# Patient Record
Sex: MALE | URBAN - METROPOLITAN AREA
[De-identification: names, ages, dates, MRNs, and addresses within clinical notes are randomized per-mention and may not be internally consistent; named-entity substitution may affect disease eponyms.]

---

## 2017-06-24 ENCOUNTER — INPATIENT (INPATIENT)
Facility: HOSPITAL | Age: 57
LOS: 3 days | Discharge: HOME | DRG: 100 | End: 2017-06-28
Attending: PSYCHIATRY & NEUROLOGY | Admitting: PSYCHIATRY & NEUROLOGY
Payer: MEDICAID

## 2017-06-24 VITALS
DIASTOLIC BLOOD PRESSURE: 75 MMHG | RESPIRATION RATE: 16 BRPM | HEART RATE: 61 BPM | OXYGEN SATURATION: 97 % | TEMPERATURE: 98 F | SYSTOLIC BLOOD PRESSURE: 112 MMHG

## 2017-06-24 DIAGNOSIS — Z21 ASYMPTOMATIC HUMAN IMMUNODEFICIENCY VIRUS [HIV] INFECTION STATUS: ICD-10-CM

## 2017-06-24 DIAGNOSIS — R63.8 OTHER SYMPTOMS AND SIGNS CONCERNING FOOD AND FLUID INTAKE: ICD-10-CM

## 2017-06-24 DIAGNOSIS — R56.9 UNSPECIFIED CONVULSIONS: ICD-10-CM

## 2017-06-24 DIAGNOSIS — Z29.9 ENCOUNTER FOR PROPHYLACTIC MEASURES, UNSPECIFIED: ICD-10-CM

## 2017-06-24 LAB
ALBUMIN SERPL ELPH-MCNC: 3.9 G/DL — SIGNIFICANT CHANGE UP (ref 3.3–5)
ALP SERPL-CCNC: 78 U/L — SIGNIFICANT CHANGE UP (ref 40–120)
ALT FLD-CCNC: 17 U/L — SIGNIFICANT CHANGE UP (ref 10–45)
ANION GAP SERPL CALC-SCNC: 8 MMOL/L — SIGNIFICANT CHANGE UP (ref 5–17)
ANISOCYTOSIS BLD QL: SLIGHT — SIGNIFICANT CHANGE UP
APPEARANCE UR: CLEAR — SIGNIFICANT CHANGE UP
AST SERPL-CCNC: 19 U/L — SIGNIFICANT CHANGE UP (ref 10–40)
BILIRUB SERPL-MCNC: 0.4 MG/DL — SIGNIFICANT CHANGE UP (ref 0.2–1.2)
BILIRUB UR-MCNC: NEGATIVE — SIGNIFICANT CHANGE UP
BUN SERPL-MCNC: 14 MG/DL — SIGNIFICANT CHANGE UP (ref 7–23)
CALCIUM SERPL-MCNC: 9.2 MG/DL — SIGNIFICANT CHANGE UP (ref 8.4–10.5)
CHLORIDE SERPL-SCNC: 100 MMOL/L — SIGNIFICANT CHANGE UP (ref 96–108)
CK SERPL-CCNC: 119 U/L — SIGNIFICANT CHANGE UP (ref 30–200)
CO2 SERPL-SCNC: 29 MMOL/L — SIGNIFICANT CHANGE UP (ref 22–31)
COLOR SPEC: YELLOW — SIGNIFICANT CHANGE UP
CREAT SERPL-MCNC: 0.7 MG/DL — SIGNIFICANT CHANGE UP (ref 0.5–1.3)
DIFF PNL FLD: NEGATIVE — SIGNIFICANT CHANGE UP
EOSINOPHIL NFR BLD AUTO: 1 % — SIGNIFICANT CHANGE UP (ref 0–6)
EOSINOPHIL NFR BLD AUTO: 2 % — SIGNIFICANT CHANGE UP (ref 0–6)
ETHANOL SERPL-MCNC: <10 MG/DL — SIGNIFICANT CHANGE UP (ref 0–10)
GLUCOSE SERPL-MCNC: 95 MG/DL — SIGNIFICANT CHANGE UP (ref 70–99)
GLUCOSE UR QL: NEGATIVE — SIGNIFICANT CHANGE UP
HCT VFR BLD CALC: 36.8 % — LOW (ref 39–50)
HGB BLD-MCNC: 13 G/DL — SIGNIFICANT CHANGE UP (ref 13–17)
KETONES UR-MCNC: NEGATIVE — SIGNIFICANT CHANGE UP
LACTATE SERPL-SCNC: 0.8 MMOL/L — SIGNIFICANT CHANGE UP (ref 0.5–2)
LEUKOCYTE ESTERASE UR-ACNC: NEGATIVE — SIGNIFICANT CHANGE UP
LYMPHOCYTES # BLD AUTO: 21 % — SIGNIFICANT CHANGE UP (ref 13–44)
LYMPHOCYTES # BLD AUTO: 25 % — SIGNIFICANT CHANGE UP (ref 13–44)
MACROCYTES BLD QL: SLIGHT — SIGNIFICANT CHANGE UP
MAGNESIUM SERPL-MCNC: 2 MG/DL — SIGNIFICANT CHANGE UP (ref 1.6–2.6)
MANUAL SMEAR VERIFICATION: SIGNIFICANT CHANGE UP
MCHC RBC-ENTMCNC: 27.1 PG — SIGNIFICANT CHANGE UP (ref 27–34)
MCHC RBC-ENTMCNC: 35.3 G/DL — SIGNIFICANT CHANGE UP (ref 32–36)
MCV RBC AUTO: 76.7 FL — LOW (ref 80–100)
MONOCYTES NFR BLD AUTO: 18 % — HIGH (ref 2–14)
MONOCYTES NFR BLD AUTO: 7 % — SIGNIFICANT CHANGE UP (ref 2–14)
NEUTROPHILS NFR BLD AUTO: 56 % — SIGNIFICANT CHANGE UP (ref 43–77)
NEUTROPHILS NFR BLD AUTO: 69 % — SIGNIFICANT CHANGE UP (ref 43–77)
NITRITE UR-MCNC: NEGATIVE — SIGNIFICANT CHANGE UP
OVALOCYTES BLD QL SMEAR: SLIGHT — SIGNIFICANT CHANGE UP
PCP SPEC-MCNC: SIGNIFICANT CHANGE UP
PH UR: 7 — SIGNIFICANT CHANGE UP (ref 5–8)
PHOSPHATE SERPL-MCNC: 2.1 MG/DL — LOW (ref 2.5–4.5)
PLAT MORPH BLD: NORMAL — SIGNIFICANT CHANGE UP
PLATELET # BLD AUTO: 161 K/UL — SIGNIFICANT CHANGE UP (ref 150–400)
POTASSIUM SERPL-MCNC: 4 MMOL/L — SIGNIFICANT CHANGE UP (ref 3.5–5.3)
POTASSIUM SERPL-SCNC: 4 MMOL/L — SIGNIFICANT CHANGE UP (ref 3.5–5.3)
PROT SERPL-MCNC: 7.4 G/DL — SIGNIFICANT CHANGE UP (ref 6–8.3)
PROT UR-MCNC: NEGATIVE MG/DL — SIGNIFICANT CHANGE UP
RBC # BLD: 4.8 M/UL — SIGNIFICANT CHANGE UP (ref 4.2–5.8)
RBC # FLD: 14.4 % — SIGNIFICANT CHANGE UP (ref 10.3–16.9)
RBC BLD AUTO: (no result)
SODIUM SERPL-SCNC: 137 MMOL/L — SIGNIFICANT CHANGE UP (ref 135–145)
SP GR SPEC: 1.01 — SIGNIFICANT CHANGE UP (ref 1–1.03)
SPHEROCYTES BLD QL SMEAR: SLIGHT — SIGNIFICANT CHANGE UP
TSH SERPL-MCNC: 0.07 UIU/ML — LOW (ref 0.35–4.94)
UROBILINOGEN FLD QL: 0.2 E.U./DL — SIGNIFICANT CHANGE UP
WBC # BLD: 3.2 K/UL — LOW (ref 3.8–10.5)
WBC # FLD AUTO: 3.2 K/UL — LOW (ref 3.8–10.5)

## 2017-06-24 PROCEDURE — 99285 EMERGENCY DEPT VISIT HI MDM: CPT | Mod: 25

## 2017-06-24 PROCEDURE — 99223 1ST HOSP IP/OBS HIGH 75: CPT

## 2017-06-24 PROCEDURE — 93010 ELECTROCARDIOGRAM REPORT: CPT

## 2017-06-24 PROCEDURE — 70450 CT HEAD/BRAIN W/O DYE: CPT | Mod: 26

## 2017-06-24 PROCEDURE — 71020: CPT | Mod: 26

## 2017-06-24 RX ORDER — HEPARIN SODIUM 5000 [USP'U]/ML
5000 INJECTION INTRAVENOUS; SUBCUTANEOUS EVERY 8 HOURS
Qty: 0 | Refills: 0 | Status: DISCONTINUED | OUTPATIENT
Start: 2017-06-24 | End: 2017-06-26

## 2017-06-24 RX ORDER — ASPIRIN/CALCIUM CARB/MAGNESIUM 324 MG
1 TABLET ORAL
Qty: 0 | Refills: 0 | COMMUNITY

## 2017-06-24 RX ORDER — METHADONE HYDROCHLORIDE 40 MG/1
5 TABLET ORAL
Qty: 0 | Refills: 0 | COMMUNITY

## 2017-06-24 RX ORDER — ELVITEGRAVIR, COBICISTAT, EMTRICITABINE, AND TENOFOVIR ALAFENAMIDE 150; 150; 200; 10 MG/1; MG/1; MG/1; MG/1
1 TABLET ORAL
Qty: 0 | Refills: 0 | COMMUNITY

## 2017-06-24 RX ORDER — ASCORBIC ACID 60 MG
1 TABLET,CHEWABLE ORAL
Qty: 0 | Refills: 0 | COMMUNITY

## 2017-06-24 RX ADMIN — HEPARIN SODIUM 5000 UNIT(S): 5000 INJECTION INTRAVENOUS; SUBCUTANEOUS at 21:33
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
## 2017-06-24 NOTE — H&P ADULT - NSHPSOCIALHISTORY_GEN_ALL_CORE
Recent smoker, 3 cigarettes daily for the past few months; does not drink alcohol; ex-heroin user, currently does not use illicit drugs (intermittently takes methadone)

## 2017-06-24 NOTE — ED PROVIDER NOTE - ATTENDING CONTRIBUTION TO CARE
Pt is a 58yo m, h/o hiv (? cd4/vl), ex-ivda on methadone, who presents s/p sz witnessed by brother. Pt does not recall event. + tongue biting. No urinary/ fecal incontinence. Currently c/o mild fatigue. No cp, sob, palp, ha, visual changes, focal n/t/w... Afebrile. HDS. WNWD m in nad. + s1, s2, rrr. Lungs cta b/l. Abd soft, nt/nd. Neuro exam non-focal. + superficial abrasion to tip of tongue. Labs reviewed with findings of mildy low wbc at 3; electrolytes wnl; lactate wnl. CT head neg for acute injury. Case d/w epilepsy; recommending admission for further w/u including video eeg.
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
## 2017-06-24 NOTE — H&P ADULT - PROBLEM SELECTOR PLAN 4
Regular diet as tolerated.  Replete lytes prn  PO fluids    Dispo:  Admit to [facility] for vEEG monitoring.

## 2017-06-24 NOTE — ED ADULT NURSE NOTE - OBJECTIVE STATEMENT
Patient, hx HIV, polio ( left hand weakness ), substance abuse  A&OX3 denies headache, Patiens brother witnessed him having a seizure ( shaking ) in the bathroom ( 5 minutes)  . Patient was confused after event . Denies injury. Pt is on Methadone 15 mg have methadone for 5 days.
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
## 2017-06-24 NOTE — ED PROVIDER NOTE - OBJECTIVE STATEMENT
58 y/o m with hx HIV+ on HAART (reports VL undetectable), former IVDU on methadone presents stating brother witnessed him having a seizure this morning.  Pt stating he doesn't remember what happened, he was watching TV and he woke up and his brother had called 911.  Brother stating he thinks it lasted "about 8 minutes".  Pt stating he feels well now, slightly tired.  Pt does report bleeding from his mouth, saw a cut on his tongue.  Denies fever, headache, blurred vision, weakness, trauma, all other ROS negative.

## 2017-06-24 NOTE — H&P ADULT - PROBLEM SELECTOR PLAN 2
Leukocytosis 2/2 HIV, no signs of sepsis at this time. Per patient, does not remember medication however states takes 2 pill once a day, possibly Genvoya.  - need collateral regarding medication and previous VL/CD4s (Dr. Goodwin?)  - f/u CD4 count, VL  - no need for PPX at this time Leukopenia 2/2 HIV, no signs of sepsis at this time. Per patient, does not remember medication however states takes 1 pill once a day, possibly Genvoya.  - need collateral regarding medication and previous VL/CD4s (Dr. Goodwin?)  - f/u CD4 count, VL  - no need for PPX at this time

## 2017-06-24 NOTE — ED ADULT TRIAGE NOTE - CHIEF COMPLAINT QUOTE
new onset seizure today. As per brother "he was shaking and it last about 3 minutes." Patient noted to have laceration to tip of tongue. Patient denies hx of seizures. A&Ox3 on arrival, ambulating with steady gait.

## 2017-06-24 NOTE — ED PROVIDER NOTE - MEDICAL DECISION MAKING DETAILS
58 y/o m s/p witnessed seizure today; labs wnl including lactate, head CT shows no masses.  Case discussed with epilepsy Dr. Ross and will admit to EMU for further w/u, EEG monitoring.

## 2017-06-25 LAB
ALBUMIN SERPL ELPH-MCNC: 3.7 G/DL — SIGNIFICANT CHANGE UP (ref 3.3–5)
ALP SERPL-CCNC: 74 U/L — SIGNIFICANT CHANGE UP (ref 40–120)
ALT FLD-CCNC: 12 U/L — SIGNIFICANT CHANGE UP (ref 10–45)
ANION GAP SERPL CALC-SCNC: 9 MMOL/L — SIGNIFICANT CHANGE UP (ref 5–17)
APTT BLD: 27.8 SEC — SIGNIFICANT CHANGE UP (ref 27.5–37.4)
AST SERPL-CCNC: 17 U/L — SIGNIFICANT CHANGE UP (ref 10–40)
BASOPHILS NFR BLD AUTO: 0.3 % — SIGNIFICANT CHANGE UP (ref 0–2)
BILIRUB SERPL-MCNC: 0.4 MG/DL — SIGNIFICANT CHANGE UP (ref 0.2–1.2)
BUN SERPL-MCNC: 13 MG/DL — SIGNIFICANT CHANGE UP (ref 7–23)
CALCIUM SERPL-MCNC: 9.1 MG/DL — SIGNIFICANT CHANGE UP (ref 8.4–10.5)
CHLORIDE SERPL-SCNC: 103 MMOL/L — SIGNIFICANT CHANGE UP (ref 96–108)
CO2 SERPL-SCNC: 28 MMOL/L — SIGNIFICANT CHANGE UP (ref 22–31)
CREAT SERPL-MCNC: 0.6 MG/DL — SIGNIFICANT CHANGE UP (ref 0.5–1.3)
EOSINOPHIL NFR BLD AUTO: 1.5 % — SIGNIFICANT CHANGE UP (ref 0–6)
FOLATE SERPL-MCNC: >20 NG/ML — SIGNIFICANT CHANGE UP (ref 4.8–24.2)
GLUCOSE SERPL-MCNC: 121 MG/DL — HIGH (ref 70–99)
HCT VFR BLD CALC: 36.8 % — LOW (ref 39–50)
HGB BLD-MCNC: 13 G/DL — SIGNIFICANT CHANGE UP (ref 13–17)
INR BLD: 1.11 — SIGNIFICANT CHANGE UP (ref 0.88–1.16)
LYMPHOCYTES # BLD AUTO: 36.7 % — SIGNIFICANT CHANGE UP (ref 13–44)
MAGNESIUM SERPL-MCNC: 2.1 MG/DL — SIGNIFICANT CHANGE UP (ref 1.6–2.6)
MCHC RBC-ENTMCNC: 26.5 PG — LOW (ref 27–34)
MCHC RBC-ENTMCNC: 35.3 G/DL — SIGNIFICANT CHANGE UP (ref 32–36)
MCV RBC AUTO: 74.9 FL — LOW (ref 80–100)
MONOCYTES NFR BLD AUTO: 9 % — SIGNIFICANT CHANGE UP (ref 2–14)
NEUTROPHILS NFR BLD AUTO: 52.5 % — SIGNIFICANT CHANGE UP (ref 43–77)
PHOSPHATE SERPL-MCNC: 2.8 MG/DL — SIGNIFICANT CHANGE UP (ref 2.5–4.5)
PLATELET # BLD AUTO: 177 K/UL — SIGNIFICANT CHANGE UP (ref 150–400)
POTASSIUM SERPL-MCNC: 4 MMOL/L — SIGNIFICANT CHANGE UP (ref 3.5–5.3)
POTASSIUM SERPL-SCNC: 4 MMOL/L — SIGNIFICANT CHANGE UP (ref 3.5–5.3)
PROT SERPL-MCNC: 7.2 G/DL — SIGNIFICANT CHANGE UP (ref 6–8.3)
PROTHROM AB SERPL-ACNC: 12.4 SEC — SIGNIFICANT CHANGE UP (ref 9.8–12.7)
RBC # BLD: 4.91 M/UL — SIGNIFICANT CHANGE UP (ref 4.2–5.8)
RBC # FLD: 13.9 % — SIGNIFICANT CHANGE UP (ref 10.3–16.9)
SODIUM SERPL-SCNC: 140 MMOL/L — SIGNIFICANT CHANGE UP (ref 135–145)
VIT B12 SERPL-MCNC: 371 PG/ML — SIGNIFICANT CHANGE UP (ref 243–894)
WBC # BLD: 3.4 K/UL — LOW (ref 3.8–10.5)
WBC # FLD AUTO: 3.4 K/UL — LOW (ref 3.8–10.5)

## 2017-06-25 PROCEDURE — 95951: CPT | Mod: 26

## 2017-06-25 PROCEDURE — 99232 SBSQ HOSP IP/OBS MODERATE 35: CPT

## 2017-06-25 RX ORDER — ACETAMINOPHEN 500 MG
650 TABLET ORAL EVERY 6 HOURS
Qty: 0 | Refills: 0 | Status: DISCONTINUED | OUTPATIENT
Start: 2017-06-25 | End: 2017-06-28

## 2017-06-25 RX ORDER — ELVITEGRAVIR, COBICISTAT, EMTRICITABINE, AND TENOFOVIR ALAFENAMIDE 150; 150; 200; 10 MG/1; MG/1; MG/1; MG/1
1 TABLET ORAL DAILY
Qty: 0 | Refills: 0 | Status: DISCONTINUED | OUTPATIENT
Start: 2017-06-25 | End: 2017-06-28

## 2017-06-25 RX ORDER — LEVETIRACETAM 250 MG/1
500 TABLET, FILM COATED ORAL
Qty: 0 | Refills: 0 | Status: DISCONTINUED | OUTPATIENT
Start: 2017-06-25 | End: 2017-06-28

## 2017-06-25 RX ORDER — LEVETIRACETAM 250 MG/1
750 TABLET, FILM COATED ORAL ONCE
Qty: 0 | Refills: 0 | Status: COMPLETED | OUTPATIENT
Start: 2017-06-25 | End: 2017-06-25

## 2017-06-25 RX ADMIN — HEPARIN SODIUM 5000 UNIT(S): 5000 INJECTION INTRAVENOUS; SUBCUTANEOUS at 15:40

## 2017-06-25 RX ADMIN — ELVITEGRAVIR, COBICISTAT, EMTRICITABINE, AND TENOFOVIR ALAFENAMIDE 1 TABLET(S): 150; 150; 200; 10 TABLET ORAL at 17:00

## 2017-06-25 RX ADMIN — HEPARIN SODIUM 5000 UNIT(S): 5000 INJECTION INTRAVENOUS; SUBCUTANEOUS at 05:57

## 2017-06-25 RX ADMIN — LEVETIRACETAM 400 MILLIGRAM(S): 250 TABLET, FILM COATED ORAL at 11:52

## 2017-06-25 RX ADMIN — HEPARIN SODIUM 5000 UNIT(S): 5000 INJECTION INTRAVENOUS; SUBCUTANEOUS at 21:35

## 2017-06-25 RX ADMIN — LEVETIRACETAM 500 MILLIGRAM(S): 250 TABLET, FILM COATED ORAL at 21:13

## 2017-06-25 RX ADMIN — Medication 650 MILLIGRAM(S): at 22:10

## 2017-06-25 RX ADMIN — Medication 650 MILLIGRAM(S): at 21:13
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
## 2017-06-25 NOTE — PROGRESS NOTE ADULT - SUBJECTIVE AND OBJECTIVE BOX
CHIEF COMPLAINT:    Seizures.    INTERVAL HISTORY:    Patient reports no further seizures or confusion, however there were 2 left temporal seizures last night.  THey were relatively bland (the first with his brother talking on the phone besides his bed), but one with clear lip smacking and oromotor automatisms awakening him from sleep.      Discussed options to treat seizures, including trial of levetiracetam.    REVIEW OF SYSTEMS:  As per HPI, otherwise negative for Constitutional, Eyes, Ears/Nose/Mouth/Throat, Neck, Cardiovascular, Respiratory, Gastrointestinal, Genitourinary, Skin, Endocrine, Musculoskeletal, Psychiatric, and Hematologic/Lymphatic.    VITAL SIGNS:  Vital Signs Last 24 Hrs  T(C): 36.9, Max: 36.9 (06-25 @ 11:45)  T(F): 98.4, Max: 98.4 (06-25 @ 11:45)  HR: 57 (51 - 58)  BP: 121/76 (112/76 - 121/76)  BP(mean): --  RR: 15 (15 - 16)  SpO2: 96% (96% - 98%)    PHYSICAL EXAMINATION:  General: Well-developed, well nourished, in no acute distress.  Eyes: Conjunctiva and sclera clear.  Neurologic:  - Mental Status:  Alert, awake, oriented to person, place, and time; Speech is fluent:    Pupils are equal, round, and reactive to light.  III, IV, VI:  Extraocular movements are intact without nystagmus.  VII:  Face is symmetric with normal eye closure and smile  VIII:  Hearing is intact to finger rub.  IX, X:  Uvula is midline and soft palate rises symmetrically  XI:  Head turning and shoulder shrug are intact.  XII:  Tongue protrudes in the midline - small laceration in the left tip.  - Motor:    Bulk Left arm atrophy and paresis.  - Gait:   Normal steps

## 2017-06-25 NOTE — PROGRESS NOTE ADULT - PROBLEM SELECTOR PLAN 2
Leukopenia 2/2 HIV, no signs of sepsis at this time. Per patient, does not remember medication however states takes 1 pill once a day, possibly Genvoya.  - need collateral regarding medication and previous VL/CD4s (Dr. Goodwin?)  - f/u CD4 count, VL  - no need for PPX at this time
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
## 2017-06-26 ENCOUNTER — TRANSCRIPTION ENCOUNTER (OUTPATIENT)
Age: 57
End: 2017-06-26

## 2017-06-26 DIAGNOSIS — R94.6 ABNORMAL RESULTS OF THYROID FUNCTION STUDIES: ICD-10-CM

## 2017-06-26 DIAGNOSIS — B20 HUMAN IMMUNODEFICIENCY VIRUS [HIV] DISEASE: ICD-10-CM

## 2017-06-26 LAB
4/8 RATIO: 0.1 RATIO — LOW (ref 0.9–3.6)
ABS CD8: 748 /UL — SIGNIFICANT CHANGE UP (ref 136–757)
CD3 BLASTS SPEC-ACNC: 79 % — SIGNIFICANT CHANGE UP (ref 59–85)
CD3 BLASTS SPEC-ACNC: 840 /UL — SIGNIFICANT CHANGE UP (ref 799–2171)
CD4 %: 7 % — LOW (ref 36–65)
CD8 %: 70 % — HIGH (ref 11–36)
HIV-1 VIRAL LOAD RESULT: (no result)
HIV1 RNA # SERPL NAA+PROBE: SIGNIFICANT CHANGE UP
HIV1 RNA SER-IMP: SIGNIFICANT CHANGE UP
HIV1 RNA SERPL NAA+PROBE-ACNC: (no result)
HIV1 RNA SERPL NAA+PROBE-LOG#: 3.79 — SIGNIFICANT CHANGE UP
T-CELL CD4 SUBSET PNL BLD: 76 /UL — LOW (ref 489–1457)
T4 AB SER-ACNC: 7.75 UG/DL — SIGNIFICANT CHANGE UP (ref 3.17–11.72)
TSH SERPL-MCNC: 0.51 UIU/ML — SIGNIFICANT CHANGE UP (ref 0.35–4.94)

## 2017-06-26 PROCEDURE — 99233 SBSQ HOSP IP/OBS HIGH 50: CPT

## 2017-06-26 PROCEDURE — 70491 CT SOFT TISSUE NECK W/DYE: CPT | Mod: 26

## 2017-06-26 PROCEDURE — 70460 CT HEAD/BRAIN W/DYE: CPT | Mod: 26

## 2017-06-26 RX ADMIN — Medication 650 MILLIGRAM(S): at 07:01

## 2017-06-26 RX ADMIN — HEPARIN SODIUM 5000 UNIT(S): 5000 INJECTION INTRAVENOUS; SUBCUTANEOUS at 14:52

## 2017-06-26 RX ADMIN — Medication 650 MILLIGRAM(S): at 06:16

## 2017-06-26 RX ADMIN — Medication 1 TABLET(S): at 12:09

## 2017-06-26 RX ADMIN — LEVETIRACETAM 500 MILLIGRAM(S): 250 TABLET, FILM COATED ORAL at 06:13

## 2017-06-26 RX ADMIN — ELVITEGRAVIR, COBICISTAT, EMTRICITABINE, AND TENOFOVIR ALAFENAMIDE 1 TABLET(S): 150; 150; 200; 10 TABLET ORAL at 12:09

## 2017-06-26 RX ADMIN — HEPARIN SODIUM 5000 UNIT(S): 5000 INJECTION INTRAVENOUS; SUBCUTANEOUS at 06:13

## 2017-06-26 RX ADMIN — LEVETIRACETAM 500 MILLIGRAM(S): 250 TABLET, FILM COATED ORAL at 18:50

## 2017-06-26 NOTE — DISCHARGE NOTE ADULT - PROVIDER TOKENS
KIKI:'[id number]' TOKIVAN:'[id number]',FREE:[LAST:[LeMonda],FIRST:[Shiela],PHONE:[phone]],FAX:[(   )    -],ADDRESS:[[facility] [address]    APPOINTMENT SCHEDULED: Friday 7/7/17 at 9:00am]],FREE:[LAST:[Osbaldo],FIRST:[David CHERY), Neurology],PHONE:[phone]],FAX:[(   )    -],ADDRESS:[[facility] [address]     APPOINTMENT SCHEDULED: Wednesday 7/5/17 at 9:30am]] KIKI:'[id number]'
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
## 2017-06-26 NOTE — DISCHARGE NOTE ADULT - MEDICATION SUMMARY - MEDICATIONS TO TAKE
I will START or STAY ON the medications listed below when I get home from the hospital:    methadone 10 mg/5 mL oral solution  -- 5 milliliter(s) by mouth every 8 hours  -- Indication: For Substance abuse    aspirin 81 mg oral delayed release tablet  -- 1 tab(s) by mouth once a day  -- Indication: For Need for prophylactic measure    Genvoya oral tablet  -- 1 tab(s) by mouth once a day  -- Indication: For HIV (human immunodeficiency virus infection)    Vitamin C  mg oral capsule  -- 1 cap(s) by mouth once a day  -- Indication: For Nutrition, metabolism, and development symptoms I will START or STAY ON the medications listed below when I get home from the hospital:    methadone 10 mg/5 mL oral solution  -- 5 milliliter(s) by mouth every 8 hours  -- Indication: For Substance abuse    aspirin 81 mg oral delayed release tablet  -- 1 tab(s) by mouth once a day  -- Indication: For Need for prophylactic measure    levETIRAcetam 500 mg oral tablet  -- 1 tab(s) by mouth 2 times a day  -- Indication: For Seizure    Genvoya oral tablet  -- 1 tab(s) by mouth once a day  -- Indication: For HIV (human immunodeficiency virus infection)    sulfamethoxazole-trimethoprim 800 mg-160 mg oral tablet  -- 1 tab(s) by mouth once a day  -- Indication: For HIV (human immunodeficiency virus infection)    Vitamin C  mg oral capsule  -- 1 cap(s) by mouth once a day  -- Indication: For Nutrition, metabolism, and development symptoms
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
## 2017-06-26 NOTE — PROGRESS NOTE ADULT - PROBLEM SELECTOR PLAN 2
Leukopenia 2/2 HIV, no signs of sepsis at this time. Per patient, does not remember medication however states takes 1 pill once a day, possibly Genvoya.  - need collateral regarding medication and previous VL/CD4s (Dr. Goodwin?)  - f/u CD4 count, VL  - no need for PPX at this time Leukopenia 2/2 HIV, no signs of sepsis at this time. Per patient, does not remember medication however states takes 1 pill once a day, possibly Genvoya.  - need collateral regarding medication and previous VL/CD4s (Dr. Goodwin?)  - CD4 count 76, appreciate HIV/ID recommendations- pending CTH and neck w/wo contrast  - Dr. Roblero of Neuro-infectious disease also consulted- ? need for LP  - Neuropsych consulted due to poor performance on MoCA. ? AIDs-related dementia versus more acute process resulting in cognitive issues.

## 2017-06-26 NOTE — DISCHARGE NOTE ADULT - CARE PROVIDER_API CALL
Malick Ross (MD), Clinical Neurophysiology; Neurology; Sleep Medicine  [address] [place] [address]  Phone: [phone]  Fax: [phone] Malick Ross), Clinical Neurophysiology; Neurology; Sleep Medicine  [address] [address]  Phone: [phone]  Fax: [phone]    Shiela Dominique  [address]    APPOINTMENT SCHEDULED: Friday 7/7/17 at 9:00am  Phone: [phone]  Fax: (   )    -    David Roblero), Neurology  [address]     APPOINTMENT SCHEDULED: Wednesday 7/5/17 at 9:30am  Phone: [phone]  Fax: (   )    - Malick Ross (MD), Clinical Neurophysiology; Neurology; Sleep Medicine  [address] [place] [address]  Phone: [phone]  Fax: [phone]
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
## 2017-06-26 NOTE — DISCHARGE NOTE ADULT - PLAN OF CARE
Follow up with your PMD Follow up with your primary medical doctor Your CD4 count is 76 with a detectable viral load. This puts you at higher risk for serious infections. Please follow up with your primary care doctor and take your medications as prescribed. Follow up with Dr. Roblero for further evaluation. Continue to take your seizure medication (Keppra) as prescribed. Follow up with Dr. 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 Follow up with Dr. Roblero for further evaluation. Continue to take your seizure medication (Keppra) as prescribed. It is important for you to stop using marijuana, heroin and any other drugs. If you need assistance with stopping, please seek out a drug assistance program through [facility]. Your CD4 count is 76 with an HIV viral load of 6192. This puts you at higher risk for serious infections. Please take your Genvoya every day.  It is important that you DO NOT miss doses as this increases the likelihood of developing medication resistance and failing to control the virus. Please take Bactrim once a day to prevent pneumonia. You were admitted to the hospital after having 2 seizures at home. You were evaluated on an EEG and found to have 2 additional seizures in the hospital. You were started on an anti-seizure medication called Keppra which is preventing further seizures. Please continue to take Keppra 500mg twice a day.  DO NOT DRIVE/OPERATE HEAVY MACHINERY due to seizure risk. Follow up with Dr. Roblero for further evaluation.
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
## 2017-06-26 NOTE — CONSULT NOTE ADULT - PROBLEM SELECTOR RECOMMENDATION 9
-VL 6192, CD4 76. On Genvoya until 1 month ago.  -Patient reports long history of undetectable viral load and CD4 count > 200 until very recently, which makes the likelihood of oppurtunistic infection as a cause of his seizures less likely. Drug use or withdrawal may be an etiology given his history.  -Please obtain collateral from his outpatient provider re: historical viral load and CD4 count, history of OIs, medication history.  -Please check HSV-1/2 serologies, CMV PCR in serum, Cryptococcal Antigen in serum, Syphillis screen, GC/Chlamydia urine.  -CT head without contrast negative. Please check CT Brain with contrast.  -Please resume Genvoya qDaily.  -Please start PCP prophylaxis with Bactrim DS 1 tab daily while CD4 < 200.  -Will follow.    -Plan was discussed with the patient.  -Case discussed with Dr Rowland and Primary team.
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
## 2017-06-26 NOTE — DISCHARGE NOTE ADULT - CARE PLAN
Principal Discharge DX:	Seizure  Secondary Diagnosis:	HIV (human immunodeficiency virus infection)  Goal:	Follow up with your primary medical doctor  Secondary Diagnosis:	Substance abuse  Secondary Diagnosis:	TSH deficiency  Goal:	Follow up with your PMD Principal Discharge DX:	Seizure  Goal:	Follow up with Dr. Roblero  Instructions for follow-up, activity and diet:	Follow up with Dr. Roblero for further evaluation. Continue to take your seizure medication (Keppra) as prescribed.  Secondary Diagnosis:	HIV (human immunodeficiency virus infection)  Goal:	Follow up with your primary medical doctor  Instructions for follow-up, activity and diet:	Your CD4 count is 76 with a detectable viral load. This puts you at higher risk for serious infections. Please follow up with your primary care doctor and take your medications as prescribed.  Secondary Diagnosis:	Substance abuse  Secondary Diagnosis:	TSH deficiency  Goal:	Follow up with your PMD Principal Discharge DX:	Seizure  Goal:	Follow up with Dr. Roblero  Instructions for follow-up, activity and diet:	DO NOT DRIVE/OPERATE HEAVY MACHINARY due to seizure risk. Follow up with Dr. Roblero for further evaluation. Continue to take your seizure medication (Keppra) as prescribed.  Secondary Diagnosis:	HIV (human immunodeficiency virus infection)  Goal:	Follow up with your primary medical doctor  Instructions for follow-up, activity and diet:	Your CD4 count is 76 with a detectable viral load. This puts you at higher risk for serious infections. Please follow up with your primary care doctor and take your medications as prescribed.  Secondary Diagnosis:	Substance abuse  Secondary Diagnosis:	TSH deficiency  Goal:	Follow up with your PMD Principal Discharge DX:	Seizure  Goal:	Follow up with Dr. Roblero  Instructions for follow-up, activity and diet:	You were admitted to the hospital after having 2 seizures at home. You were evaluated on an EEG and found to have 2 additional seizures in the hospital. You were started on an anti-seizure medication called Keppra which is preventing further seizures. Please continue to take Keppra 500mg twice a day.  DO NOT DRIVE/OPERATE HEAVY MACHINERY due to seizure risk. Follow up with Dr. Roblero for further evaluation.  Secondary Diagnosis:	HIV (human immunodeficiency virus infection)  Goal:	Follow up with your primary medical doctor  Instructions for follow-up, activity and diet:	Your CD4 count is 76 with an HIV viral load of 6192. This puts you at higher risk for serious infections. Please take your Genvoya every day.  It is important that you DO NOT miss doses as this increases the likelihood of developing medication resistance and failing to control the virus. Please take Bactrim once a day to prevent pneumonia.  Secondary Diagnosis:	Substance abuse  Instructions for follow-up, activity and diet:	It is important for you to stop using marijuana, heroin and any other drugs. If you need assistance with stopping, please seek out a drug assistance program through [facility]. Principal Discharge DX:	Seizure  Goal:	Follow up with Dr. Roblero  Instructions for follow-up, activity and diet:	You were admitted to the hospital after having 2 seizures at home. You were evaluated on an EEG and found to have 2 additional seizures in the hospital. You were started on an anti-seizure medication called Keppra which is preventing further seizures. Please continue to take Keppra 500mg twice a day.  DO NOT DRIVE/OPERATE HEAVY MACHINERY due to seizure risk. Follow up with Dr. Roblero for further evaluation.  Secondary Diagnosis:	HIV (human immunodeficiency virus infection)  Goal:	Follow up with your primary medical doctor  Instructions for follow-up, activity and diet:	Your CD4 count is 76 with an HIV viral load of 6192. This puts you at higher risk for serious infections. Please take your Genvoya every day.  It is important that you DO NOT miss doses as this increases the likelihood of developing medication resistance and failing to control the virus. Please take Bactrim once a day to prevent pneumonia.  Secondary Diagnosis:	Substance abuse  Instructions for follow-up, activity and diet:	It is important for you to stop using marijuana, heroin and any other drugs. If you need assistance with stopping, please seek out a drug assistance program through [facility]. Principal Discharge DX:	Seizure  Goal:	Follow up with Dr. Roblero  Instructions for follow-up, activity and diet:	You were admitted to the hospital after having 2 seizures at home. You were evaluated on an EEG and found to have 2 additional seizures in the hospital. You were started on an anti-seizure medication called Keppra which is preventing further seizures. Please continue to take Keppra 500mg twice a day.  DO NOT DRIVE/OPERATE HEAVY MACHINERY due to seizure risk. Follow up with Dr. Roblero for further evaluation.  Secondary Diagnosis:	HIV (human immunodeficiency virus infection)  Goal:	Follow up with your primary medical doctor  Instructions for follow-up, activity and diet:	Your CD4 count is 76 with an HIV viral load of 6192. This puts you at higher risk for serious infections. Please take your Genvoya every day.  It is important that you DO NOT miss doses as this increases the likelihood of developing medication resistance and failing to control the virus. Please take Bactrim once a day to prevent pneumonia.  Secondary Diagnosis:	Substance abuse  Instructions for follow-up, activity and diet:	It is important for you to stop using marijuana, heroin and any other drugs. If you need assistance with stopping, please seek out a drug assistance program through [facility]. Principal Discharge DX:	Seizure  Goal:	Follow up with Dr. Roblero  Instructions for follow-up, activity and diet:	You were admitted to the hospital after having 2 seizures at home. You were evaluated on an EEG and found to have 2 additional seizures in the hospital. You were started on an anti-seizure medication called Keppra which is preventing further seizures. Please continue to take Keppra 500mg twice a day.  DO NOT DRIVE/OPERATE HEAVY MACHINERY due to seizure risk. Follow up with Dr. Roblero for further evaluation.  Secondary Diagnosis:	HIV (human immunodeficiency virus infection)  Goal:	Follow up with your primary medical doctor  Instructions for follow-up, activity and diet:	Your CD4 count is 76 with an HIV viral load of 6192. This puts you at higher risk for serious infections. Please take your Genvoya every day.  It is important that you DO NOT miss doses as this increases the likelihood of developing medication resistance and failing to control the virus. Please take Bactrim once a day to prevent pneumonia.  Secondary Diagnosis:	Substance abuse  Instructions for follow-up, activity and diet:	It is important for you to stop using marijuana, heroin and any other drugs. If you need assistance with stopping, please seek out a drug assistance program through [facility].

## 2017-06-26 NOTE — DISCHARGE NOTE ADULT - CARE PROVIDERS DIRECT ADDRESSES
[email or web address],[email or web address] [email or web address]

## 2017-06-27 LAB
APPEARANCE CSF: CLEAR — SIGNIFICANT CHANGE UP
APPEARANCE SPUN FLD: COLORLESS — SIGNIFICANT CHANGE UP
COLOR CSF: SIGNIFICANT CHANGE UP
CRYPTOC AG CSF-ACNC: NEGATIVE — SIGNIFICANT CHANGE UP
CRYPTOC AG FLD QL: NEGATIVE — SIGNIFICANT CHANGE UP
CSF COMMENTS: SIGNIFICANT CHANGE UP
GLUCOSE CSF-MCNC: 59 MG/DL — SIGNIFICANT CHANGE UP (ref 40–70)
GRAM STN FLD: SIGNIFICANT CHANGE UP
HSV1 IGG SER-ACNC: 1.5 INDEX — HIGH
HSV1 IGG SERPL QL IA: POSITIVE
HSV2 IGG FLD-ACNC: 4.18 INDEX — HIGH
HSV2 IGG SERPL QL IA: POSITIVE
LYMPHOCYTES # CSF: 1 % — LOW (ref 40–80)
NEUTROPHILS # CSF: 0 % — SIGNIFICANT CHANGE UP (ref 0–6)
NRBC NFR CSF: 1 /UL — SIGNIFICANT CHANGE UP (ref 0–5)
PROT CSF-MCNC: 45 MG/DL — SIGNIFICANT CHANGE UP (ref 15–45)
RBC # CSF: 11 /UL — HIGH (ref 0–0)
SPECIMEN SOURCE: SIGNIFICANT CHANGE UP
T PALLIDUM AB TITR SER: NEGATIVE — SIGNIFICANT CHANGE UP
T3 SERPL-MCNC: 124 NG/DL — SIGNIFICANT CHANGE UP (ref 80–200)
TUBE TYPE: SIGNIFICANT CHANGE UP

## 2017-06-27 PROCEDURE — 62270 DX LMBR SPI PNXR: CPT | Mod: GC

## 2017-06-27 PROCEDURE — 99233 SBSQ HOSP IP/OBS HIGH 50: CPT

## 2017-06-27 PROCEDURE — 95951: CPT | Mod: 26

## 2017-06-27 PROCEDURE — 96116 NUBHVL XM PHYS/QHP 1ST HR: CPT

## 2017-06-27 RX ADMIN — LEVETIRACETAM 500 MILLIGRAM(S): 250 TABLET, FILM COATED ORAL at 18:48

## 2017-06-27 RX ADMIN — Medication 1 TABLET(S): at 11:31

## 2017-06-27 RX ADMIN — ELVITEGRAVIR, COBICISTAT, EMTRICITABINE, AND TENOFOVIR ALAFENAMIDE 1 TABLET(S): 150; 150; 200; 10 TABLET ORAL at 11:31

## 2017-06-27 RX ADMIN — LEVETIRACETAM 500 MILLIGRAM(S): 250 TABLET, FILM COATED ORAL at 06:21
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
## 2017-06-27 NOTE — PROGRESS NOTE ADULT - PROBLEM SELECTOR PLAN 2
Leukopenia 2/2 HIV, no signs of sepsis at this time. Per patient, does not remember medication however states takes 1 pill once a day, possibly Genvoya.  - need collateral regarding medication and previous VL/CD4s (Dr. Goodwin?)  - CD4 count 76, appreciate HIV/ID recommendations- pending CTH and neck w/wo contrast  - Dr. Roblero of Neuro-infectious disease also consulted- ? need for LP  - Neuropsych consulted due to poor performance on MoCA. ? AIDs-related dementia versus more acute process resulting in cognitive issues. Leukopenia 2/2 HIV, no signs of sepsis at this time. Per patient, does not remember medication however states takes 1 pill once a day, possibly Genvoya.  - need collateral regarding medication and previous VL/CD4s (Dr. Goodwin?)  - CD4 count 76, appreciate HIV/ID recommendations
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
## 2017-06-27 NOTE — PROGRESS NOTE ADULT - PROBLEM SELECTOR PROBLEM 2
HIV (human immunodeficiency virus infection)

## 2017-06-27 NOTE — PROCEDURE NOTE - NSPROCDETAILS_GEN_ALL_CORE
location identified, draped/prepped, sterile technique used, needle inserted/introduced/CSF Obtained/area cleaned in sterile fashion

## 2017-06-27 NOTE — CONSULT NOTE ADULT - SUBJECTIVE AND OBJECTIVE BOX
Patient was seen at bedside on the EMU. Interview was conducted. Testing was about to begin, however, patient underwent LP so testing was halted. Patient's brother then came to visit. Examiner went back in the afternoon to resume testing, however, patient indicated that he no longer wished to continue with the assessment. He noted that he did not feel well after the LP. He indicated that he would be open to completed the assessment on another day while on the EMU or as an outpatient. I gave him my card and told him to follow-up to schedule the evaluation. Full report will follow after patient completes testing as an outpatient (or inpatient should he be able to be accommodated in the coming days before his discharge). Patient was seen at bedside on the EMU. Interview was conducted. Testing was about to begin, however, patient underwent LP so testing was halted. Patient's brother then came to visit. Examiner went back in the afternoon to resume testing, however, patient indicated that he no longer wished to continue with the assessment. He noted that he did not feel well after the LP. He indicated that he would be open to completed the assessment on another day while on the EMU or as an outpatient. I gave him my card and told him to follow-up to schedule the evaluation. Full report will follow after patient completes testing as an outpatient (or inpatient should he be able to be accommodated in the coming days before his discharge).   1 hour clinical interview, clinical assessment of the patient, collateral interview, test selection,

## 2017-06-27 NOTE — PROGRESS NOTE ADULT - PROBLEM SELECTOR PLAN 5
Regular diet as tolerated.  Replete lytes prn  PO fluids    Dispo:  Admit to [facility] for vEEG monitoring.
- repeat TSH as initial level was WNL
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
## 2017-06-27 NOTE — PROGRESS NOTE ADULT - PROBLEM SELECTOR PLAN 3
- repeat TSH as initial level was WNL  -AM TSH normal - repeat TSH and T4 are WNL. Probable lab error.

## 2017-06-28 VITALS — WEIGHT: 188.94 LBS

## 2017-06-28 LAB
CMV DNA CSF QL NAA+PROBE: SIGNIFICANT CHANGE UP
LABORATORY COMMENT REPORT: SIGNIFICANT CHANGE UP
SOURCE HSV 1/2: SIGNIFICANT CHANGE UP

## 2017-06-28 PROCEDURE — 99238 HOSP IP/OBS DSCHRG MGMT 30/<: CPT

## 2017-06-28 RX ORDER — LEVETIRACETAM 250 MG/1
1 TABLET, FILM COATED ORAL
Qty: 60 | Refills: 0 | OUTPATIENT
Start: 2017-06-28 | End: 2017-07-28

## 2017-06-28 RX ADMIN — ELVITEGRAVIR, COBICISTAT, EMTRICITABINE, AND TENOFOVIR ALAFENAMIDE 1 TABLET(S): 150; 150; 200; 10 TABLET ORAL at 11:44

## 2017-06-28 RX ADMIN — LEVETIRACETAM 500 MILLIGRAM(S): 250 TABLET, FILM COATED ORAL at 06:45

## 2017-06-28 RX ADMIN — Medication 1 TABLET(S): at 11:44

## 2017-06-28 NOTE — DIETITIAN INITIAL EVALUATION ADULT. - ENERGY NEEDS
BMI:20.6,IBW:196lbs,UBW:189lbs,85% of IBW.Skin intact.No N/V/D or pain.Poor dentition.Increase stephanie/protein and fluid needs due to HIV and weight loss.

## 2017-06-28 NOTE — DIETITIAN INITIAL EVALUATION ADULT. - NS AS NUTRI INTERV MEALS SNACK
Specific foods/beverages or groups/ensure enlive BID(700kcal and 40gmprotein)/General/healthful diet/Energy - modified diet/Protein - modified diet

## 2017-06-28 NOTE — DIETITIAN INITIAL EVALUATION ADULT. - OTHER INFO
58 y/o male admitted for neuro work-up.Patient noted to be noncompliant with HIV regimen.Claims weight loss due to lack of appetite.

## 2017-06-29 LAB
CMV DNA # UR NAA DL=200: SIGNIFICANT CHANGE UP
CMV DNA SPEC QL NAA+PROBE: SIGNIFICANT CHANGE UP
EBV DNA SPEC QL NAA+PROBE: SIGNIFICANT CHANGE UP
EBV PCR: SIGNIFICANT CHANGE UP
SOURCE CMV: SIGNIFICANT CHANGE UP
SOURCE EBV: SIGNIFICANT CHANGE UP
SOURCE VZV: SIGNIFICANT CHANGE UP
VZV DNA CSF NAA+PROBE-LOG#: SIGNIFICANT CHANGE UP
VZV PCR: SIGNIFICANT CHANGE UP

## 2017-06-30 LAB — JCPYV DNA # CSF NAA+PROBE: SIGNIFICANT CHANGE UP

## 2017-07-01 DIAGNOSIS — R56.9 UNSPECIFIED CONVULSIONS: ICD-10-CM

## 2017-07-01 DIAGNOSIS — F11.99 OPIOID USE, UNSPECIFIED WITH UNSPECIFIED OPIOID-INDUCED DISORDER: ICD-10-CM

## 2017-07-01 DIAGNOSIS — G40.909 EPILEPSY, UNSPECIFIED, NOT INTRACTABLE, WITHOUT STATUS EPILEPTICUS: ICD-10-CM

## 2017-07-01 DIAGNOSIS — Z88.0 ALLERGY STATUS TO PENICILLIN: ICD-10-CM

## 2017-07-01 DIAGNOSIS — B20 HUMAN IMMUNODEFICIENCY VIRUS [HIV] DISEASE: ICD-10-CM

## 2017-07-01 DIAGNOSIS — F17.210 NICOTINE DEPENDENCE, CIGARETTES, UNCOMPLICATED: ICD-10-CM

## 2017-07-01 DIAGNOSIS — Z79.82 LONG TERM (CURRENT) USE OF ASPIRIN: ICD-10-CM

## 2017-07-01 DIAGNOSIS — D72.819 DECREASED WHITE BLOOD CELL COUNT, UNSPECIFIED: ICD-10-CM

## 2017-07-01 LAB
T GONDII IGG CSF-ACNC: <0.9 — SIGNIFICANT CHANGE UP
T GONDII IGM CSF-ACNC: <0.8 — SIGNIFICANT CHANGE UP

## 2017-07-10 LAB
CULTURE RESULTS: NO GROWTH — SIGNIFICANT CHANGE UP
SPECIMEN SOURCE: SIGNIFICANT CHANGE UP

## 2017-07-23 PROCEDURE — 82550 ASSAY OF CK (CPK): CPT

## 2017-07-23 PROCEDURE — 85610 PROTHROMBIN TIME: CPT

## 2017-07-23 PROCEDURE — 84443 ASSAY THYROID STIM HORMONE: CPT

## 2017-07-23 PROCEDURE — 70460 CT HEAD/BRAIN W/DYE: CPT

## 2017-07-23 PROCEDURE — 86777 TOXOPLASMA ANTIBODY: CPT

## 2017-07-23 PROCEDURE — 86695 HERPES SIMPLEX TYPE 1 TEST: CPT

## 2017-07-23 PROCEDURE — 82607 VITAMIN B-12: CPT

## 2017-07-23 PROCEDURE — 84436 ASSAY OF TOTAL THYROXINE: CPT

## 2017-07-23 PROCEDURE — 99285 EMERGENCY DEPT VISIT HI MDM: CPT | Mod: 25

## 2017-07-23 PROCEDURE — 85025 COMPLETE CBC W/AUTO DIFF WBC: CPT

## 2017-07-23 PROCEDURE — 70491 CT SOFT TISSUE NECK W/DYE: CPT

## 2017-07-23 PROCEDURE — 84480 ASSAY TRIIODOTHYRONINE (T3): CPT

## 2017-07-23 PROCEDURE — 84157 ASSAY OF PROTEIN OTHER: CPT

## 2017-07-23 PROCEDURE — 86696 HERPES SIMPLEX TYPE 2 TEST: CPT

## 2017-07-23 PROCEDURE — 89051 BODY FLUID CELL COUNT: CPT

## 2017-07-23 PROCEDURE — 83605 ASSAY OF LACTIC ACID: CPT

## 2017-07-23 PROCEDURE — 82945 GLUCOSE OTHER FLUID: CPT

## 2017-07-23 PROCEDURE — 86359 T CELLS TOTAL COUNT: CPT

## 2017-07-23 PROCEDURE — 84100 ASSAY OF PHOSPHORUS: CPT

## 2017-07-23 PROCEDURE — 95951: CPT

## 2017-07-23 PROCEDURE — 86780 TREPONEMA PALLIDUM: CPT

## 2017-07-23 PROCEDURE — 87536 HIV-1 QUANT&REVRSE TRNSCRPJ: CPT

## 2017-07-23 PROCEDURE — 83735 ASSAY OF MAGNESIUM: CPT

## 2017-07-23 PROCEDURE — 70450 CT HEAD/BRAIN W/O DYE: CPT

## 2017-07-23 PROCEDURE — 87070 CULTURE OTHR SPECIMN AEROBIC: CPT

## 2017-07-23 PROCEDURE — 82746 ASSAY OF FOLIC ACID SERUM: CPT

## 2017-07-23 PROCEDURE — 80307 DRUG TEST PRSMV CHEM ANLYZR: CPT

## 2017-07-23 PROCEDURE — 86403 PARTICLE AGGLUT ANTBDY SCRN: CPT

## 2017-07-23 PROCEDURE — 87205 SMEAR GRAM STAIN: CPT

## 2017-07-23 PROCEDURE — 36415 COLL VENOUS BLD VENIPUNCTURE: CPT

## 2017-07-23 PROCEDURE — 85730 THROMBOPLASTIN TIME PARTIAL: CPT

## 2017-07-23 PROCEDURE — 87529 HSV DNA AMP PROBE: CPT

## 2017-07-23 PROCEDURE — 87799 DETECT AGENT NOS DNA QUANT: CPT

## 2017-07-23 PROCEDURE — 71046 X-RAY EXAM CHEST 2 VIEWS: CPT

## 2017-07-23 PROCEDURE — 80053 COMPREHEN METABOLIC PANEL: CPT

## 2017-07-23 PROCEDURE — 93005 ELECTROCARDIOGRAM TRACING: CPT

## 2017-07-23 PROCEDURE — 87496 CYTOMEG DNA AMP PROBE: CPT

## 2017-07-23 PROCEDURE — 81003 URINALYSIS AUTO W/O SCOPE: CPT

## 2017-08-02 PROBLEM — Z21 ASYMPTOMATIC HUMAN IMMUNODEFICIENCY VIRUS [HIV] INFECTION STATUS: Chronic | Status: ACTIVE | Noted: 2017-06-24

## 2017-08-02 PROBLEM — F19.10 OTHER PSYCHOACTIVE SUBSTANCE ABUSE, UNCOMPLICATED: Chronic | Status: ACTIVE | Noted: 2017-06-24

## 2017-08-03 PROBLEM — Z00.00 ENCOUNTER FOR PREVENTIVE HEALTH EXAMINATION: Status: ACTIVE | Noted: 2017-08-03

## 2017-08-04 ENCOUNTER — APPOINTMENT (OUTPATIENT)
Dept: NEUROLOGY | Facility: CLINIC | Age: 57
End: 2017-08-04

## 2018-01-10 ENCOUNTER — HOSPITAL ENCOUNTER (INPATIENT)
Dept: HOSPITAL 74 - YASAS | Age: 58
LOS: 5 days | Discharge: HOME | DRG: 773 | End: 2018-01-15
Attending: INTERNAL MEDICINE | Admitting: INTERNAL MEDICINE
Payer: COMMERCIAL

## 2018-01-10 VITALS — BODY MASS INDEX: 18.1 KG/M2

## 2018-01-10 DIAGNOSIS — R26.81: ICD-10-CM

## 2018-01-10 DIAGNOSIS — R63.6: ICD-10-CM

## 2018-01-10 DIAGNOSIS — F11.23: Primary | ICD-10-CM

## 2018-01-10 DIAGNOSIS — F17.210: ICD-10-CM

## 2018-01-10 DIAGNOSIS — A80.9: ICD-10-CM

## 2018-01-10 DIAGNOSIS — G81.94: ICD-10-CM

## 2018-01-10 LAB
ALBUMIN SERPL-MCNC: 3.4 G/DL (ref 3.4–5)
ALP SERPL-CCNC: 76 U/L (ref 45–117)
ALT SERPL-CCNC: 22 U/L (ref 12–78)
ANION GAP SERPL CALC-SCNC: 8 MMOL/L (ref 8–16)
AST SERPL-CCNC: 14 U/L (ref 15–37)
BILIRUB SERPL-MCNC: 0.4 MG/DL (ref 0.2–1)
BUN SERPL-MCNC: 14 MG/DL (ref 7–18)
CALCIUM SERPL-MCNC: 8.2 MG/DL (ref 8.5–10.1)
CHLORIDE SERPL-SCNC: 102 MMOL/L (ref 98–107)
CO2 SERPL-SCNC: 29 MMOL/L (ref 21–32)
CREAT SERPL-MCNC: 0.6 MG/DL (ref 0.7–1.3)
DEPRECATED RDW RBC AUTO: 13.6 % (ref 11.9–15.9)
GLUCOSE SERPL-MCNC: 85 MG/DL (ref 74–106)
HCT VFR BLD CALC: 37.1 % (ref 35.4–49)
HGB BLD-MCNC: 12.1 GM/DL (ref 11.7–16.9)
MCH RBC QN AUTO: 29.1 PG (ref 25.7–33.7)
MCHC RBC AUTO-ENTMCNC: 32.7 G/DL (ref 32–35.9)
MCV RBC: 89 FL (ref 80–96)
PLATELET # BLD AUTO: 187 K/MM3 (ref 134–434)
PMV BLD: 8.2 FL (ref 7.5–11.1)
POTASSIUM SERPLBLD-SCNC: 4 MMOL/L (ref 3.5–5.1)
PROT SERPL-MCNC: 6.6 G/DL (ref 6.4–8.2)
RBC # BLD AUTO: 4.17 M/MM3 (ref 4–5.6)
SODIUM SERPL-SCNC: 139 MMOL/L (ref 136–145)
WBC # BLD AUTO: 3.5 K/MM3 (ref 4–10)

## 2018-01-10 PROCEDURE — HZ2ZZZZ DETOXIFICATION SERVICES FOR SUBSTANCE ABUSE TREATMENT: ICD-10-PCS | Performed by: INTERNAL MEDICINE

## 2018-01-10 RX ADMIN — Medication SCH MG: at 22:46

## 2018-01-10 RX ADMIN — Medication SCH TAB: at 10:28

## 2018-01-10 NOTE — PN
BHS Progress Note


Note: 





PT WAS ADMITTED EARLIER THIS MORNING. ALERT O X 3. C/O FATIGUE.


 Vital Signs











Temperature  96.2 F L  01/10/18 13:16


 


Pulse Rate  63   01/10/18 13:16


 


Respiratory Rate  18   01/10/18 13:16


 


Blood Pressure  112/66   01/10/18 13:16


 


O2 Sat by Pulse Oximetry (%)      








 Laboratory Last Values











WBC  3.5 K/mm3 (4.0-10.0)  L  01/10/18  07:00    


 


RBC  4.17 M/mm3 (4.00-5.60)   01/10/18  07:00    


 


Hgb  12.1 GM/dL (11.7-16.9)   01/10/18  07:00    


 


Hct  37.1 % (35.4-49)   01/10/18  07:00    


 


MCV  89.0 fl (80-96)   01/10/18  07:00    


 


MCH  29.1 pg (25.7-33.7)   01/10/18  07:00    


 


MCHC  32.7 g/dl (32.0-35.9)   01/10/18  07:00    


 


RDW  13.6 % (11.9-15.9)   01/10/18  07:00    


 


Plt Count  187 K/MM3 (134-434)   01/10/18  07:00    


 


MPV  8.2 fl (7.5-11.1)   01/10/18  07:00    


 


Sodium  139 mmol/L (136-145)   01/10/18  07:00    


 


Potassium  4.0 mmol/L (3.5-5.1)   01/10/18  07:00    


 


Chloride  102 mmol/L ()   01/10/18  07:00    


 


Carbon Dioxide  29 mmol/L (21-32)   01/10/18  07:00    


 


Anion Gap  8  (8-16)   01/10/18  07:00    


 


BUN  14 mg/dL (7-18)   01/10/18  07:00    


 


Creatinine  0.6 mg/dL (0.7-1.3)  L  01/10/18  07:00    


 


Creat Clearance w eGFR  > 60  (>60)   01/10/18  07:00    


 


Random Glucose  85 mg/dL ()   01/10/18  07:00    


 


Calcium  8.2 mg/dL (8.5-10.1)  L  01/10/18  07:00    


 


Total Bilirubin  0.4 mg/dL (0.2-1.0)   01/10/18  07:00    


 


AST  14 U/L (15-37)  L  01/10/18  07:00    


 


ALT  22 U/L (12-78)   01/10/18  07:00    


 


Alkaline Phosphatase  76 U/L ()   01/10/18  07:00    


 


Total Protein  6.6 g/dl (6.4-8.2)   01/10/18  07:00    


 


Albumin  3.4 g/dl (3.4-5.0)   01/10/18  07:00    


 


RPR Titer  Nonreactive  (NONREACTIVE)   01/10/18  07:00    








CONTINUE DETOX

## 2018-01-10 NOTE — HP
COWS





- Scale


Resting Pulse: 0= [place] 80 or Below


Sweatin= Chills/Flushing


Restless Observation: 1= Difficult to Sit Still


Pupil Size: 1= Pupils >than Normal


Bone or Joint Aches: 2= Severe Diffuse Aches


Runny Nose/ Eye Tearin= Runny Nose/Eyes


GI Upset > 30mins: 1= Stomach Cramp


Tremor Observation: 1= Tremor Felt, Not Seen


Yawning Observation: 1= 1-2x During Session


Anxiety or Irritability: 1=Feels Anxious/Irritable


Goose Flesh Skin: 3=Piloerection


COWS Score: 14





Admission ROS S





- [facility]


Chief Complaint: 


WITHDRAWAL SYMPTOMS 


Allergies/Adverse Reactions: 


 Allergies











Allergy/AdvReac Type Severity Reaction Status Date / Time


 


No Known Allergies Allergy   Verified 01/10/18 01:26











History of Present Illness: 





57 Y.O. MAN WITH A HISTORY OPIOID DEPENDENCE IS HERE SEEKING DETOX.  HE REPORTS 

HE HAS A 15 HISTORY OF DRUG ABSTINENCE BUT STATES HE RELAPSED 2 YEARS AGO. 


Exam Limitations: Physical Impairment (LEFT-SIDED HEMIPARESIS)





- Ebola screening


Have you traveled outside of the country in the last 21 days: No


Have you been sick,other than usual withdrawal symptoms: No





- Review of Systems


Constitutional: Chills, Loss of Appetite, Changes in sleep, Weakness


EENT: reports: Tearing


Respiratory: reports: No Symptoms reported


Cardiac: reports: No Symptoms Reported


GI: reports: Abdominal cramping


: reports: No Symptoms Reported


Musculoskeletal: reports: Back Pain, Muscle Weakness, Neck Pain


Integumentary: reports: No Symptoms Reported


Neuro: reports: Weakness, Unsteady Gait


Endocrine: reports: No Symptoms Reported


Hematology: reports: No Symptoms Reported


Psychiatric: reports: No Sypmtoms Reported


Other Systems: Reviewed and Negative





Patient History





- Patient Medical History


Hx Anemia: No


Hx Asthma: No


Hx Chronic Obstructive Pulmonary Disease (COPD): No


Hx Cancer: No


Hx Cardiac Disorders: No


Hx Congestive Heart Failure: No


Hx Hypertension: No


Hx Hypercholesterolemia: No


Hx Pacemaker: No


HX Cerebrovascular Accident: No


Hx Seizures: No


Hx Dementia: No


Hx Diabetes: No


Hx Gastrointestinal Disorders: No


Hx Liver Disease: No


Hx Genitourinary Disorders: No


Hx Sexually Transmitted Disorders: No


Hx Renal Disease (ESRD): No


Hx Thyroid Disease: No


Hx Human Immunodeficiency Virus (HIV): No


Hx Hepatitis C: No


Hx Depression: No


Hx Suicide Attempt: No


Hx Bipolar Disorder: No


Hx Schizophrenia: No





- Patient Surgical History


Past Surgical History: No





- PPD History


Previous Implant?: Yes


Documented Results: Negative w/o proof


PPD to be Administered?: Yes





- Reproductive History


Patient is a Female of Child Bearing Age (11 -55 yrs old): No





- Smoking Cessation


Smoking history: Current every day smoker


Have you smoked in the past 12 months: Yes


Aproximately how many cigarettes per day: 7


Initiated information on smoking cessation: Yes


'Breaking Loose' booklet given: 01/10/18





- Substance & Tx. History


Hx Alcohol Use: No


Hx Substance Use: Yes


Substance Use Type: Heroin


Hx Substance Use Treatment: Yes (DETOX: 2 YEARS AGO BUT DOES NOT RECALL THE 

LOCATION )





- Substances Abused


  ** Heroin


Route: Inhalation


Frequency: Daily


Amount used: 5 BAGS 


Age of first use: 18


Date of Last Use: 18





Family Disease History





- Family Disease History


Family Disease History: Diabetes: Mother





[facility]





- Physical


General Appearance: Yes: Thin


HEENTM: Yes: Hearing grossly Normal, Normal ENT Inspection, Normal Voice


Respiratory: Yes: Chest Non-Tender, Lungs Clear, Normal Breath Sounds


Neck: Yes: No masses,lesions,Nodules, Trachea in good position


Breast: Yes: Breast Exam Deferred


Cardiology: Yes: Regular Rhythm, Regular Rate


Abdominal: Yes: Normal Bowel Sounds, Non Tender, Flat, Soft


Genitourinary: Yes: Other (NO COMPLAINTS REPORTED)


Back: Yes: Decreased Range of Motion


Musculoskeletal: Yes: Joint Stiffness, Muscle Pain, Muscle weakness


Extremities: Yes: Normal Capillary Refill, Normal Inspection, Normal Range of 

Motion, Non-Tender


Neurological: Yes: Alert, Normal Mood/Affect, Normal Response


Integumentary: Yes: Normal Color, Dry, Warm


Lymphatic: Yes: Within Normal Limits





- Diagnostic


(1) Opioid dependence with withdrawal


Current Visit: Yes   Status: Chronic   





(2) Poliomyelitis


Current Visit: Yes   Status: Chronic   





(3) Nicotine dependence


Current Visit: Yes   Status: Chronic   





(4) Unsteady gait


Current Visit: Yes   Status: Chronic   





(5) Hemiparesis of left nondominant side


Current Visit: Yes   Status: Chronic   





(6) Underweight


Current Visit: Yes   Status: Acute   





[facility]





- Detox or Rehab


[facility] Level of Care: Medically Managed


Detox Regimen/Protocol: Methadone





Vital Signs





- Vital Signs


Vital Signs Refused: No


Temperature: 97.0 F


Temperature Source: Oral


Pulse Rate: 68


Respiratory Rate: 16


Blood Pressure: 111/72


BP Location: Right Arm


Blood Pressure Position: Sitting





- Height


Height: 6 ft 3 in





- Weight


Weight: 145 lb


Weight Measurement Method: Stated by Patient


Body Mass Index (BMI): 18.1





Urine Drug Screen





- Control


Is Test Valid: Yes





- Results


Drug Screen Negative: No


Urine Drug Screen Results: OPI-Opiates, MTD-Methadone

## 2018-01-10 NOTE — EKG
Test Reason : 

Blood Pressure : ***/*** mmHG

Vent. Rate : 062 BPM     Atrial Rate : 062 BPM

   P-R Int : 136 ms          QRS Dur : 104 ms

    QT Int : 422 ms       P-R-T Axes : 079 020 038 degrees

   QTc Int : 428 ms

 

NORMAL SINUS RHYTHM

MINIMAL VOLTAGE CRITERIA FOR LVH, MAY BE NORMAL VARIANT

BORDERLINE ECG

NO PREVIOUS ECGS AVAILABLE

Confirmed by YASMINE HOFFMANN, REDDY ([id number]) on 1/10/2018 9:38:21 AM

 

Referred By:             Confirmed By:REDDY UNDERWOOD MD

## 2018-01-11 LAB
APPEARANCE UR: CLEAR
BILIRUB UR STRIP.AUTO-MCNC: NEGATIVE MG/DL
COLOR UR: YELLOW
KETONES UR QL STRIP: NEGATIVE
LEUKOCYTE ESTERASE UR QL STRIP.AUTO: NEGATIVE
NITRITE UR QL STRIP: NEGATIVE
PH UR: 6 [PH] (ref 5–8)
PROT UR QL STRIP: NEGATIVE
PROT UR QL STRIP: NEGATIVE
RBC # UR STRIP: NEGATIVE /UL
SP GR UR: 1 (ref 1–1.03)
UROBILINOGEN UR STRIP-MCNC: NEGATIVE MG/DL (ref 0.2–1)

## 2018-01-11 RX ADMIN — Medication SCH MG: at 22:28

## 2018-01-11 RX ADMIN — Medication SCH TAB: at 10:15

## 2018-01-11 NOTE — PN
BHS CIWA





- CIWA Score


Anxiety: 3


Paroxysmal Sweats: 3





BHS COWS





- Scale


Resting Pulse: 0= CT 80 or Below


Sweatin=Flushed/Facial Moisture


Restless Observation: 0= Sits Still


Pupil Size: 0= Normal to Room Light


Bone or Joint Aches: 1= Mild Discomfort


Runny Nose/ Eye Tearin= Nasal Congestion


GI Upset > 30mins: 0= None


Tremor Observation of Outstretched Hands: 2= Slight Tremor Visible


Yawning Observation: 2= >3x During Session


Anxiety or Irritability: 1=Feels Anxious/Irritable


Goose Flesh Skin: 3=Piloerection


COWS Score: 12





BHS Progress Note (SOAP)


Subjective: 





Interrupted sleep, shakes, body aches, sweats, chills


Objective: 





18 11:40


 Vital Signs











Temperature  96.0 F L  18 09:31


 


Pulse Rate  56 L  18 09:31


 


Respiratory Rate  18   18 09:31


 


Blood Pressure  103/73   18 09:31


 


O2 Sat by Pulse Oximetry (%)      








 Laboratory Last Values











WBC  3.5 K/mm3 (4.0-10.0)  L  01/10/18  07:00    


 


RBC  4.17 M/mm3 (4.00-5.60)   01/10/18  07:00    


 


Hgb  12.1 GM/dL (11.7-16.9)   01/10/18  07:00    


 


Hct  37.1 % (35.4-49)   01/10/18  07:00    


 


MCV  89.0 fl (80-96)   01/10/18  07:00    


 


MCH  29.1 pg (25.7-33.7)   01/10/18  07:00    


 


MCHC  32.7 g/dl (32.0-35.9)   01/10/18  07:00    


 


RDW  13.6 % (11.9-15.9)   01/10/18  07:00    


 


Plt Count  187 K/MM3 (134-434)   01/10/18  07:00    


 


MPV  8.2 fl (7.5-11.1)   01/10/18  07:00    


 


Sodium  139 mmol/L (136-145)   01/10/18  07:00    


 


Potassium  4.0 mmol/L (3.5-5.1)   01/10/18  07:00    


 


Chloride  102 mmol/L ()   01/10/18  07:00    


 


Carbon Dioxide  29 mmol/L (21-32)   01/10/18  07:00    


 


Anion Gap  8  (8-16)   01/10/18  07:00    


 


BUN  14 mg/dL (7-18)   01/10/18  07:00    


 


Creatinine  0.6 mg/dL (0.7-1.3)  L  01/10/18  07:00    


 


Creat Clearance w eGFR  > 60  (>60)   01/10/18  07:00    


 


Random Glucose  85 mg/dL ()   01/10/18  07:00    


 


Calcium  8.2 mg/dL (8.5-10.1)  L  01/10/18  07:00    


 


Total Bilirubin  0.4 mg/dL (0.2-1.0)   01/10/18  07:00    


 


AST  14 U/L (15-37)  L  01/10/18  07:00    


 


ALT  22 U/L (12-78)   01/10/18  07:00    


 


Alkaline Phosphatase  76 U/L ()   01/10/18  07:00    


 


Total Protein  6.6 g/dl (6.4-8.2)   01/10/18  07:00    


 


Albumin  3.4 g/dl (3.4-5.0)   01/10/18  07:00    


 


RPR Titer  Nonreactive  (NONREACTIVE)   01/10/18  07:00    








Labs noted 


Assessment: 





18 11:41


withdrawal symptoms 


Plan: 





Continue Detox


Increase fluids for hydration 


Continue to monitor

## 2018-01-12 RX ADMIN — Medication SCH TAB: at 10:07

## 2018-01-12 RX ADMIN — Medication SCH MG: at 22:30

## 2018-01-12 NOTE — PN
BHS COWS





- Scale


Resting Pulse: 0= [place] 80 or Below


Sweatin= Chills/Flushing


Restless Observation: 3= Extraneous Movement


Pupil Size: 0= Normal to Room Light


Bone or Joint Aches: 2= Severe Diffuse Aches


Runny Nose/ Eye Tearin= Runny Nose/Eyes


GI Upset > 30mins: 1= Stomach Cramp


Tremor Observation of Outstretched Hands: 2= Slight Tremor Visible


Yawning Observation: 1= 1-2x During Session


Anxiety or Irritability: 2=Irritable/Anxious


Goose Flesh Skin: 0=Smooth Skin


COWS Score: 14





BHS Progress Note (SOAP)


Subjective: 





Tremor, chills, headache, interrupted sleep


Objective: 





18 12:03


 Last Vital Signs











Temp Pulse Resp BP Pulse Ox


 


 97.2 F L  66   18   107/75    


 


 18 10:16  18 10:16  18 10:16  18 10:16   








 Laboratory Tests











  01/10/18 01/10/18 01/10/18





  07:00 07:00 07:00


 


WBC  3.5 L  


 


RBC  4.17  


 


Hgb  12.1  


 


Hct  37.1  


 


MCV  89.0  


 


MCH  29.1  


 


MCHC  32.7  


 


RDW  13.6  


 


Plt Count  187  


 


MPV  8.2  


 


Sodium   139 


 


Potassium   4.0 


 


Chloride   102 


 


Carbon Dioxide   29 


 


Anion Gap   8 


 


BUN   14 


 


Creatinine   0.6 L 


 


Creat Clearance w eGFR   > 60 


 


Random Glucose   85 


 


Calcium   8.2 L 


 


Total Bilirubin   0.4 


 


AST   14 L 


 


ALT   22 


 


Alkaline Phosphatase   76 


 


Total Protein   6.6 


 


Albumin   3.4 


 


Urine Color   


 


Urine Appearance   


 


Urine pH   


 


Ur Specific Gravity   


 


Urine Protein   


 


Urine Glucose (UA)   


 


Urine Ketones   


 


Urine Blood   


 


Urine Nitrite   


 


Urine Bilirubin   


 


Urine Urobilinogen   


 


Ur Leukocyte Esterase   


 


RPR Titer    Nonreactive














  18





  21:00


 


WBC 


 


RBC 


 


Hgb 


 


Hct 


 


MCV 


 


MCH 


 


MCHC 


 


RDW 


 


Plt Count 


 


MPV 


 


Sodium 


 


Potassium 


 


Chloride 


 


Carbon Dioxide 


 


Anion Gap 


 


BUN 


 


Creatinine 


 


Creat Clearance w eGFR 


 


Random Glucose 


 


Calcium 


 


Total Bilirubin 


 


AST 


 


ALT 


 


Alkaline Phosphatase 


 


Total Protein 


 


Albumin 


 


Urine Color  Yellow


 


Urine Appearance  Clear


 


Urine pH  6.0


 


Ur Specific Gravity  1.002


 


Urine Protein  Negative


 


Urine Glucose (UA)  Negative


 


Urine Ketones  Negative


 


Urine Blood  Negative


 


Urine Nitrite  Negative


 


Urine Bilirubin  Negative


 


Urine Urobilinogen  Negative


 


Ur Leukocyte Esterase  Negative


 


RPR Titer 








Labs noted


Assessment: 





18 12:04


Withdrawal symptoms


Plan: 





Continue detox


Encouraged to drink lots of water for hydration

## 2018-01-13 RX ADMIN — Medication SCH MG: at 22:15

## 2018-01-13 RX ADMIN — Medication SCH TAB: at 10:09

## 2018-01-13 NOTE — PN
[facility] Progress Note (SOAP)


Subjective: 





Generalized body aches and interrupted sleep


Objective: 





01/13/18 12:59


 Vital Signs - 8 hr











  01/13/18 01/13/18





  06:44 09:23


 


Temperature 97.3 F L 97.9 F


 


Pulse Rate 59 L 64


 


Respiratory 18 18





Rate  


 


Blood Pressure 106/73 107/71








 Laboratory Last Values











WBC  3.5 K/mm3 (4.0-10.0)  L  01/10/18  07:00    


 


RBC  4.17 M/mm3 (4.00-5.60)   01/10/18  07:00    


 


Hgb  12.1 GM/dL (11.7-16.9)   01/10/18  07:00    


 


Hct  37.1 % (35.4-49)   01/10/18  07:00    


 


MCV  89.0 fl (80-96)   01/10/18  07:00    


 


MCH  29.1 pg (25.7-33.7)   01/10/18  07:00    


 


MCHC  32.7 g/dl (32.0-35.9)   01/10/18  07:00    


 


RDW  13.6 % (11.9-15.9)   01/10/18  07:00    


 


Plt Count  187 K/MM3 (134-434)   01/10/18  07:00    


 


MPV  8.2 fl (7.5-11.1)   01/10/18  07:00    


 


Sodium  139 mmol/L (136-145)   01/10/18  07:00    


 


Potassium  4.0 mmol/L (3.5-5.1)   01/10/18  07:00    


 


Chloride  102 mmol/L ()   01/10/18  07:00    


 


Carbon Dioxide  29 mmol/L (21-32)   01/10/18  07:00    


 


Anion Gap  8  (8-16)   01/10/18  07:00    


 


BUN  14 mg/dL (7-18)   01/10/18  07:00    


 


Creatinine  0.6 mg/dL (0.7-1.3)  L  01/10/18  07:00    


 


Creat Clearance w eGFR  > 60  (>60)   01/10/18  07:00    


 


Random Glucose  85 mg/dL ()   01/10/18  07:00    


 


Calcium  8.2 mg/dL (8.5-10.1)  L  01/10/18  07:00    


 


Total Bilirubin  0.4 mg/dL (0.2-1.0)   01/10/18  07:00    


 


AST  14 U/L (15-37)  L  01/10/18  07:00    


 


ALT  22 U/L (12-78)   01/10/18  07:00    


 


Alkaline Phosphatase  76 U/L ()   01/10/18  07:00    


 


Total Protein  6.6 g/dl (6.4-8.2)   01/10/18  07:00    


 


Albumin  3.4 g/dl (3.4-5.0)   01/10/18  07:00    


 


Urine Color  Yellow   01/11/18  21:00    


 


Urine Appearance  Clear   01/11/18  21:00    


 


Urine pH  6.0  (5.0-8.0)   01/11/18  21:00    


 


Ur Specific Gravity  1.002  (1.001-1.035)   01/11/18  21:00    


 


Urine Protein  Negative  (NEGATIVE)   01/11/18  21:00    


 


Urine Glucose (UA)  Negative  (NEGATIVE)   01/11/18  21:00    


 


Urine Ketones  Negative  (NEGATIVE)   01/11/18  21:00    


 


Urine Blood  Negative  (NEGATIVE)   01/11/18  21:00    


 


Urine Nitrite  Negative  (NEGATIVE)   01/11/18  21:00    


 


Urine Bilirubin  Negative  (NEGATIVE)   01/11/18  21:00    


 


Urine Urobilinogen  Negative mg/dL (0.2-1.0)   01/11/18  21:00    


 


Ur Leukocyte Esterase  Negative  (NEGATIVE)   01/11/18  21:00    


 


RPR Titer  Nonreactive  (NONREACTIVE)   01/10/18  07:00    








Labs noted


Assessment: 





01/13/18 13:00


Withdrawal sx


Plan: 





Continue detox

## 2018-01-14 RX ADMIN — Medication SCH TAB: at 10:22

## 2018-01-14 RX ADMIN — Medication SCH MG: at 22:13

## 2018-01-14 NOTE — PN
[facility] Progress Note (SOAP)


Subjective: 





Tremors, Body Aches.


Objective: 


PT. A & O X 3, OBSERVED AMBULATING ON UNIT. NO ACUTE DISTRESS.





01/14/18 14:39


 Vital Signs











Temperature  95.9 F L  01/14/18 09:56


 


Pulse Rate  57 L  01/14/18 09:56


 


Respiratory Rate  18   01/14/18 09:56


 


Blood Pressure  111/73   01/14/18 09:56


 


O2 Sat by Pulse Oximetry (%)      








 Laboratory Tests











  01/10/18 01/10/18 01/10/18





  07:00 07:00 07:00


 


WBC  3.5 L  


 


RBC  4.17  


 


Hgb  12.1  


 


Hct  37.1  


 


MCV  89.0  


 


MCH  29.1  


 


MCHC  32.7  


 


RDW  13.6  


 


Plt Count  187  


 


MPV  8.2  


 


Sodium   139 


 


Potassium   4.0 


 


Chloride   102 


 


Carbon Dioxide   29 


 


Anion Gap   8 


 


BUN   14 


 


Creatinine   0.6 L 


 


Creat Clearance w eGFR   > 60 


 


Random Glucose   85 


 


Calcium   8.2 L 


 


Total Bilirubin   0.4 


 


AST   14 L 


 


ALT   22 


 


Alkaline Phosphatase   76 


 


Total Protein   6.6 


 


Albumin   3.4 


 


Urine Color   


 


Urine Appearance   


 


Urine pH   


 


Ur Specific Gravity   


 


Urine Protein   


 


Urine Glucose (UA)   


 


Urine Ketones   


 


Urine Blood   


 


Urine Nitrite   


 


Urine Bilirubin   


 


Urine Urobilinogen   


 


Ur Leukocyte Esterase   


 


RPR Titer    Nonreactive














  01/11/18





  21:00


 


WBC 


 


RBC 


 


Hgb 


 


Hct 


 


MCV 


 


MCH 


 


MCHC 


 


RDW 


 


Plt Count 


 


MPV 


 


Sodium 


 


Potassium 


 


Chloride 


 


Carbon Dioxide 


 


Anion Gap 


 


BUN 


 


Creatinine 


 


Creat Clearance w eGFR 


 


Random Glucose 


 


Calcium 


 


Total Bilirubin 


 


AST 


 


ALT 


 


Alkaline Phosphatase 


 


Total Protein 


 


Albumin 


 


Urine Color  Yellow


 


Urine Appearance  Clear


 


Urine pH  6.0


 


Ur Specific Gravity  1.002


 


Urine Protein  Negative


 


Urine Glucose (UA)  Negative


 


Urine Ketones  Negative


 


Urine Blood  Negative


 


Urine Nitrite  Negative


 


Urine Bilirubin  Negative


 


Urine Urobilinogen  Negative


 


Ur Leukocyte Esterase  Negative


 


RPR Titer 








LABS NOTED.


Assessment: 





01/14/18 14:39


WITHDRAWAL SYMPTOMS.


Plan: 





CONTINUE DETOX.

## 2018-01-15 VITALS — SYSTOLIC BLOOD PRESSURE: 108 MMHG | DIASTOLIC BLOOD PRESSURE: 69 MMHG | HEART RATE: 58 BPM | TEMPERATURE: 96.7 F

## 2018-01-15 RX ADMIN — Medication SCH TAB: at 09:25

## 2018-05-25 ENCOUNTER — HOSPITAL ENCOUNTER (INPATIENT)
Dept: HOSPITAL 74 - YASAS | Age: 58
LOS: 5 days | Discharge: HOME | DRG: 773 | End: 2018-05-30
Attending: SURGERY | Admitting: SURGERY
Payer: COMMERCIAL

## 2018-05-25 VITALS — BODY MASS INDEX: 20.5 KG/M2

## 2018-05-25 DIAGNOSIS — A80.9: ICD-10-CM

## 2018-05-25 DIAGNOSIS — G40.909: ICD-10-CM

## 2018-05-25 DIAGNOSIS — F11.23: Primary | ICD-10-CM

## 2018-05-25 DIAGNOSIS — R63.6: ICD-10-CM

## 2018-05-25 DIAGNOSIS — Z88.8: ICD-10-CM

## 2018-05-25 DIAGNOSIS — Z21: ICD-10-CM

## 2018-05-25 DIAGNOSIS — R26.81: ICD-10-CM

## 2018-05-25 DIAGNOSIS — E86.0: ICD-10-CM

## 2018-05-25 DIAGNOSIS — F17.213: ICD-10-CM

## 2018-05-25 DIAGNOSIS — G81.94: ICD-10-CM

## 2018-05-25 PROCEDURE — HZ2ZZZZ DETOXIFICATION SERVICES FOR SUBSTANCE ABUSE TREATMENT: ICD-10-PCS | Performed by: SURGERY

## 2018-05-25 RX ADMIN — LEVETIRACETAM SCH MG: 500 TABLET, FILM COATED ORAL at 23:41

## 2018-05-25 RX ADMIN — Medication SCH MG: at 23:40

## 2018-05-26 LAB
ALBUMIN SERPL-MCNC: 3.6 G/DL (ref 3.4–5)
ALP SERPL-CCNC: 65 U/L (ref 45–117)
ALT SERPL-CCNC: 19 U/L (ref 12–78)
ANION GAP SERPL CALC-SCNC: 6 MMOL/L (ref 8–16)
APPEARANCE UR: CLEAR
AST SERPL-CCNC: 15 U/L (ref 15–37)
BILIRUB SERPL-MCNC: 0.5 MG/DL (ref 0.2–1)
BILIRUB UR STRIP.AUTO-MCNC: NEGATIVE MG/DL
BUN SERPL-MCNC: 11 MG/DL (ref 7–18)
CALCIUM SERPL-MCNC: 8.4 MG/DL (ref 8.5–10.1)
CHLORIDE SERPL-SCNC: 104 MMOL/L (ref 98–107)
CO2 SERPL-SCNC: 29 MMOL/L (ref 21–32)
COLOR UR: YELLOW
CREAT SERPL-MCNC: 0.7 MG/DL (ref 0.7–1.3)
DEPRECATED RDW RBC AUTO: 14.4 % (ref 11.9–15.9)
GLUCOSE SERPL-MCNC: 69 MG/DL (ref 74–106)
HCT VFR BLD CALC: 37.2 % (ref 35.4–49)
HGB BLD-MCNC: 12.3 GM/DL (ref 11.7–16.9)
KETONES UR QL STRIP: NEGATIVE
LEUKOCYTE ESTERASE UR QL STRIP.AUTO: NEGATIVE
MCH RBC QN AUTO: 27.8 PG (ref 25.7–33.7)
MCHC RBC AUTO-ENTMCNC: 33.2 G/DL (ref 32–35.9)
MCV RBC: 83.6 FL (ref 80–96)
NITRITE UR QL STRIP: NEGATIVE
PH UR: 6 [PH] (ref 5–8)
PLATELET # BLD AUTO: 203 K/MM3 (ref 134–434)
PMV BLD: 7.8 FL (ref 7.5–11.1)
POTASSIUM SERPLBLD-SCNC: 4.1 MMOL/L (ref 3.5–5.1)
PROT SERPL-MCNC: 6.9 G/DL (ref 6.4–8.2)
PROT UR QL STRIP: NEGATIVE
PROT UR QL STRIP: NEGATIVE
RBC # BLD AUTO: 4.45 M/MM3 (ref 4–5.6)
SODIUM SERPL-SCNC: 139 MMOL/L (ref 136–145)
SP GR UR: 1.02 (ref 1–1.03)
UROBILINOGEN UR STRIP-MCNC: 2 MG/DL (ref 0.2–1)
WBC # BLD AUTO: 3.6 K/MM3 (ref 4–10)

## 2018-05-26 RX ADMIN — Medication PRN MG: at 22:31

## 2018-05-26 RX ADMIN — LEVETIRACETAM SCH MG: 500 TABLET, FILM COATED ORAL at 10:20

## 2018-05-26 RX ADMIN — Medication SCH TAB: at 10:20

## 2018-05-26 RX ADMIN — Medication SCH MG: at 11:52

## 2018-05-26 RX ADMIN — Medication SCH MG: at 22:31

## 2018-05-26 RX ADMIN — SULFAMETHOXAZOLE AND TRIMETHOPRIM SCH EACH: 800; 160 TABLET ORAL at 10:20

## 2018-05-26 RX ADMIN — LEVETIRACETAM SCH MG: 500 TABLET, FILM COATED ORAL at 22:31

## 2018-05-26 NOTE — EKG
Test Reason : 

Blood Pressure : ***/*** mmHG

Vent. Rate : 056 BPM     Atrial Rate : 056 BPM

   P-R Int : 152 ms          QRS Dur : 102 ms

    QT Int : 438 ms       P-R-T Axes : 067 010 033 degrees

   QTc Int : 422 ms

 

SINUS BRADYCARDIA

MINIMAL VOLTAGE CRITERIA FOR LVH, MAY BE NORMAL VARIANT

BORDERLINE ECG

WHEN COMPARED WITH ECG OF 10-MAYI-2018 03:45,

NO SIGNIFICANT CHANGE WAS FOUND

Confirmed by YASMINE HOFFMANN, REDDY ([id number]) on 5/26/2018 8:46:55 AM

 

Referred By:             Confirmed By:REDDY UNDERWOOD MD

## 2018-05-26 NOTE — PN
[facility] COWS





- Scale


Resting Pulse: 0= [place] 80 or Below


Sweatin= Chills/Flushing


Restless Observation: 3= Extraneous Movement


Pupil Size: 1= Pupils >than Normal


Bone or Joint Aches: 2= Severe Diffuse Aches


Runny Nose/ Eye Tearin= Runny Nose/Eyes


GI Upset > 30mins: 2= Nausea/Diarrhea


Tremor Observation of Outstretched Hands: 2= Slight Tremor Visible


Yawning Observation: 1= 1-2x During Session


Anxiety or Irritability: 2=Irritable/Anxious


Goose Flesh Skin: 0=Smooth Skin


COWS Score: 16





[facility] Progress Note (SOAP)


Subjective: 





ALERT,IRRITABLE,ANXIOUS,INTERRUPTED SLEEP,TREMOR,PAIN IN THE BODY


Objective: 





18 10:50


 Vital Signs











Temperature  98.4 F   18 09:17


 


Pulse Rate  60   18 09:17


 


Respiratory Rate  16   18 09:17


 


Blood Pressure  111/74   18 09:17


 


O2 Sat by Pulse Oximetry (%)      








EKG SINUS BRADYCARDIA,LVH


/422


 Laboratory Last Values











WBC  3.6 K/mm3 (4.0-10.0)  L  18  07:50    


 


RBC  4.45 M/mm3 (4.00-5.60)   18  07:50    


 


Hgb  12.3 GM/dL (11.7-16.9)   18  07:50    


 


Hct  37.2 % (35.4-49)   18  07:50    


 


MCV  83.6 fl (80-96)   18  07:50    


 


MCH  27.8 pg (25.7-33.7)   18  07:50    


 


MCHC  33.2 g/dl (32.0-35.9)   18  07:50    


 


RDW  14.4 % (11.9-15.9)   18  07:50    


 


Plt Count  203 K/MM3 (134-434)   18  07:50    


 


MPV  7.8 fl (7.5-11.1)   18  07:50    


 


Urine Color  Yellow   18  23:49    


 


Urine Appearance  Clear   18  23:49    


 


Urine pH  6.0  (5.0-8.0)   18  23:49    


 


Ur Specific Gravity  1.023  (1.001-1.035)   18  23:49    


 


Urine Protein  Negative  (NEGATIVE)   18  23:49    


 


Urine Glucose (UA)  Negative  (NEGATIVE)   18  23:49    


 


Urine Ketones  Negative  (NEGATIVE)   18  23:49    


 


Urine Blood  Negative  (NEGATIVE)   18  23:49    


 


Urine Nitrite  Negative  (NEGATIVE)   18  23:49    


 


Urine Bilirubin  Negative  (<2.0 mg/dL)   18  23:49    


 


Urine Urobilinogen  2.0 mg/dL (0.2-1.0)   18  23:49    


 


Ur Leukocyte Esterase  Negative  (NEGATIVE)   18  23:49    








LABS PENDING





Assessment: 





18 10:53


WITHDRAWAL SYMPTOM


Plan: 





CONTINUE DETOX

## 2018-05-26 NOTE — CONSULT
[facility] Psychiatric Consult





- Data


Date of interview: 05/26/18


Admission source: [facility]


Identifying data: Readmission to [facility] for this 57 y/o AA male seeking 

detox treatment on [address] for heroin dependence.Patient is ,a father 

of one,domiciled,unemployed and currently supported on SSI benefits.


Substance Abuse History: Discussed with the patient.Mr Sanchez confirms tears of 

heroin abuse via snorting.Smoking history: Current every day smoker.  Have you 

smoked in the past 12 months: Yes.  Aproximately how many cigarettes per day: 

3.  Hx Chewing Tobacco Use: No.  Initiated information on smoking cessation: 

Yes.  'Breaking Loose' booklet given: 05/25/18.  - Substance & Tx. History.  Hx 

Alcohol Use: No.  Hx Substance Use: Yes.  Substance Use Type: Heroin.  Hx 

Substance Use Treatment: Yes (1 prior detox attempt).  - Substances Abused.  ** 

Heroin.  Route: Inhalation.  Frequency: Daily.  Amount used: 5 BAGS.  Age of 

first use: 57.  Date of Last Use: 05/24/18


Medical History: HIV infection since 1979 (on HAART medications),left-sided 

weakness (complication of polio),seizure disorder (on levetiracetam),weight 

loss and history of poliomyelitis (left wrist in splint).


Psychiatric History: Patient denies.


Physical/Sexual Abuse/Trauma History: Patient denies history of 

abuse.Traumatized by 15 cumulative years of incarceration and a history of 

multiple deaths in the family (patient, serving time, was not able to attend 

several funerals).Experiences dysphoria and guilt at times.Coping well.


Additional Comment: Urine Drug Screen Results: OPI-Opiates.Noted.





Mental Status Exam





- Mental Status Exam


Alert and Oriented to: Time, Place, Person


Cognitive Function: Good


Patient Appearance: Well Groomed (tall staure,thin habitus,atrophy of left arm 

and hand)


Mood: Withdrawn, Hopeful


Affect: Appropriate, Normal Range


Patient Behavior: Fatigued, Appropriate, Cooperative


Speech Pattern: Clear, Appropriate


Voice Loudness: Normal


Thought Process: Intact, Goal Oriented


Thought Disorder: Not Present


Hallucinations: Denies


Suicidal Ideation: Denies


Homicidal Ideation: Denies


Insight/Judgement: Fair


Sleep: Fair


Appetite: Fair, Weight loss


Gait/Station: Normal





Psychiatric Findings





- Problem List (Axis 1, 2,3)


(1) Opioid dependence with withdrawal


Current Visit: Yes   Status: Acute   





(2) Nicotine dependence


Current Visit: Yes   Status: Acute   


Qualifiers: 


   Nicotine product type: cigarettes   Substance use status: uncomplicated   

Qualified Code(s): F17.210 - Nicotine dependence, cigarettes, uncomplicated   





- Initial Treatment Plan


Initial Treatment Plan: Psychoeducation and support.Sleep 

hygiene.Detoxification in progress.Observation.

## 2018-05-27 RX ADMIN — LEVETIRACETAM SCH MG: 500 TABLET, FILM COATED ORAL at 22:21

## 2018-05-27 RX ADMIN — Medication PRN MG: at 22:21

## 2018-05-27 RX ADMIN — Medication SCH MG: at 22:21

## 2018-05-27 RX ADMIN — Medication SCH TAB: at 10:15

## 2018-05-27 RX ADMIN — Medication SCH MG: at 10:15

## 2018-05-27 RX ADMIN — LEVETIRACETAM SCH MG: 500 TABLET, FILM COATED ORAL at 10:15

## 2018-05-27 RX ADMIN — SULFAMETHOXAZOLE AND TRIMETHOPRIM SCH EACH: 800; 160 TABLET ORAL at 10:15

## 2018-05-27 NOTE — PN
[facility] COWS





- Scale


Resting Pulse: 0= [place] 80 or Below


Sweatin= Chills/Flushing


Restless Observation: 3= Extraneous Movement


Pupil Size: 1= Pupils >than Normal


Bone or Joint Aches: 2= Severe Diffuse Aches


Runny Nose/ Eye Tearin= Runny Nose/Eyes


GI Upset > 30mins: 2= Nausea/Diarrhea


Tremor Observation of Outstretched Hands: 2= Slight Tremor Visible


Yawning Observation: 1= 1-2x During Session


Anxiety or Irritability: 2=Irritable/Anxious


Goose Flesh Skin: 0=Smooth Skin


COWS Score: 16





B[facility] Progress Note (SOAP)


Subjective: 





alert,irritable,anxious,interrupted sleep,tremor,pain in the body and back


Objective: 





18 13:23


 Vital Signs











Temperature  98.4 F   18 09:13


 


Pulse Rate  49 L  18 09:13


 


Respiratory Rate  20   18 09:13


 


Blood Pressure  105/68   18 09:13


 


O2 Sat by Pulse Oximetry (%)      








 Laboratory Last Values











WBC  3.6 K/mm3 (4.0-10.0)  L  18  07:50    


 


RBC  4.45 M/mm3 (4.00-5.60)   18  07:50    


 


Hgb  12.3 GM/dL (11.7-16.9)   18  07:50    


 


Hct  37.2 % (35.4-49)   18  07:50    


 


MCV  83.6 fl (80-96)   18  07:50    


 


MCH  27.8 pg (25.7-33.7)   18  07:50    


 


MCHC  33.2 g/dl (32.0-35.9)   18  07:50    


 


RDW  14.4 % (11.9-15.9)   18  07:50    


 


Plt Count  203 K/MM3 (134-434)   18  07:50    


 


MPV  7.8 fl (7.5-11.1)   18  07:50    


 


Sodium  139 mmol/L (136-145)   18  07:50    


 


Potassium  4.1 mmol/L (3.5-5.1)   18  07:50    


 


Chloride  104 mmol/L ()   18  07:50    


 


Carbon Dioxide  29 mmol/L (21-32)   18  07:50    


 


Anion Gap  6  (8-16)  L  18  07:50    


 


BUN  11 mg/dL (7-18)  D 18  07:50    


 


Creatinine  0.7 mg/dL (0.7-1.3)   18  07:50    


 


Creat Clearance w eGFR  > 60  (>60)   18  07:50    


 


Random Glucose  69 mg/dL ()  L  18  07:50    


 


Calcium  8.4 mg/dL (8.5-10.1)  L  18  07:50    


 


Total Bilirubin  0.5 mg/dL (0.2-1.0)  D 18  07:50    


 


AST  15 U/L (15-37)   18  07:50    


 


ALT  19 U/L (12-78)   18  07:50    


 


Alkaline Phosphatase  65 U/L ()   18  07:50    


 


Total Protein  6.9 g/dl (6.4-8.2)   18  07:50    


 


Albumin  3.6 g/dl (3.4-5.0)   18  07:50    


 


Urine Color  Yellow   18  23:49    


 


Urine Appearance  Clear   18  23:49    


 


Urine pH  6.0  (5.0-8.0)   18  23:49    


 


Ur Specific Gravity  1.023  (1.001-1.035)   18  23:49    


 


Urine Protein  Negative  (NEGATIVE)   18  23:49    


 


Urine Glucose (UA)  Negative  (NEGATIVE)   18  23:49    


 


Urine Ketones  Negative  (NEGATIVE)   18  23:49    


 


Urine Blood  Negative  (NEGATIVE)   18  23:49    


 


Urine Nitrite  Negative  (NEGATIVE)   18  23:49    


 


Urine Bilirubin  Negative  (<2.0 mg/dL)   18  23:49    


 


Urine Urobilinogen  2.0 mg/dL (0.2-1.0)   18  23:49    


 


Ur Leukocyte Esterase  Negative  (NEGATIVE)   18  23:49    


 


RPR Titer  Nonreactive  (NONREACTIVE)   18  07:50    











Assessment: 





18 13:24


withdrawal symptom


Plan: 





continue detox

## 2018-05-28 RX ADMIN — Medication SCH MG: at 10:37

## 2018-05-28 RX ADMIN — SULFAMETHOXAZOLE AND TRIMETHOPRIM SCH EACH: 800; 160 TABLET ORAL at 10:37

## 2018-05-28 RX ADMIN — LEVETIRACETAM SCH MG: 500 TABLET, FILM COATED ORAL at 22:36

## 2018-05-28 RX ADMIN — Medication SCH MG: at 22:36

## 2018-05-28 RX ADMIN — Medication SCH TAB: at 10:37

## 2018-05-28 RX ADMIN — LEVETIRACETAM SCH MG: 500 TABLET, FILM COATED ORAL at 10:37

## 2018-05-28 RX ADMIN — Medication PRN MG: at 22:36

## 2018-05-28 NOTE — PN
[facility] Progress Note (SOAP)


Subjective: 





ALERT,IRRITABLE,ANXIOUS,INTERRUPTED SLEEP


Objective: 





05/28/18 12:55


 Vital Signs











Temperature  97.9 F   05/28/18 09:26


 


Pulse Rate  56 L  05/28/18 09:26


 


Respiratory Rate  16   05/28/18 09:26


 


Blood Pressure  100/56   05/28/18 09:26


 


O2 Sat by Pulse Oximetry (%)      











Assessment: 





05/28/18 12:55


WITHDRAWAL SYMPTOM


Plan: 





CONTINUE DETOX

## 2018-05-29 RX ADMIN — SULFAMETHOXAZOLE AND TRIMETHOPRIM SCH EACH: 800; 160 TABLET ORAL at 10:27

## 2018-05-29 RX ADMIN — Medication SCH MG: at 22:19

## 2018-05-29 RX ADMIN — Medication SCH MG: at 10:27

## 2018-05-29 RX ADMIN — Medication SCH TAB: at 10:26

## 2018-05-29 RX ADMIN — Medication PRN MG: at 22:19

## 2018-05-29 RX ADMIN — LEVETIRACETAM SCH MG: 500 TABLET, FILM COATED ORAL at 22:19

## 2018-05-29 RX ADMIN — LEVETIRACETAM SCH MG: 500 TABLET, FILM COATED ORAL at 10:27

## 2018-05-29 NOTE — PN
[facility] Progress Note (SOAP)


Subjective: 





ALERT,IRRITABLE,INTERRUPTED SLEEP


Objective: 





05/29/18 12:11


 Vital Signs











Temperature  97.7 F   05/29/18 10:36


 


Pulse Rate  58 L  05/29/18 10:36


 


Respiratory Rate  18   05/29/18 10:36


 


Blood Pressure  115/57   05/29/18 10:36


 


O2 Sat by Pulse Oximetry (%)      











Assessment: 





05/29/18 12:12


WITHDRAWAL SYMPTOM


Plan: 





CONTINUE DETOX,DISCHARGE IN AM

## 2018-05-30 ENCOUNTER — HOSPITAL ENCOUNTER (INPATIENT)
Dept: HOSPITAL 74 - YASAS | Age: 58
LOS: 1 days | Discharge: LEFT BEFORE DISCHARGE | DRG: 770 | End: 2018-05-31
Attending: PSYCHIATRY & NEUROLOGY | Admitting: PSYCHIATRY & NEUROLOGY
Payer: COMMERCIAL

## 2018-05-30 VITALS — SYSTOLIC BLOOD PRESSURE: 123 MMHG | HEART RATE: 73 BPM | DIASTOLIC BLOOD PRESSURE: 56 MMHG | TEMPERATURE: 97.8 F

## 2018-05-30 VITALS — BODY MASS INDEX: 20.5 KG/M2

## 2018-05-30 DIAGNOSIS — Z21: ICD-10-CM

## 2018-05-30 DIAGNOSIS — G81.94: ICD-10-CM

## 2018-05-30 DIAGNOSIS — R26.81: ICD-10-CM

## 2018-05-30 DIAGNOSIS — F17.200: ICD-10-CM

## 2018-05-30 DIAGNOSIS — F11.20: Primary | ICD-10-CM

## 2018-05-30 DIAGNOSIS — E86.0: ICD-10-CM

## 2018-05-30 DIAGNOSIS — L98.8: ICD-10-CM

## 2018-05-30 DIAGNOSIS — A80.9: ICD-10-CM

## 2018-05-30 PROCEDURE — HZ42ZZZ GROUP COUNSELING FOR SUBSTANCE ABUSE TREATMENT, COGNITIVE-BEHAVIORAL: ICD-10-PCS | Performed by: PSYCHIATRY & NEUROLOGY

## 2018-05-30 RX ADMIN — Medication SCH TAB: at 10:52

## 2018-05-30 RX ADMIN — SULFAMETHOXAZOLE AND TRIMETHOPRIM SCH EACH: 800; 160 TABLET ORAL at 10:52

## 2018-05-30 RX ADMIN — LEVETIRACETAM SCH MG: 500 TABLET, FILM COATED ORAL at 10:52

## 2018-05-30 RX ADMIN — LEVETIRACETAM SCH MG: 500 TABLET, FILM COATED ORAL at 21:28

## 2018-05-30 RX ADMIN — Medication SCH MG: at 10:52

## 2018-05-30 NOTE — PN
[facility] Progress Note (SOAP)


Subjective: 





ALERT,NO COMPLAINT


Objective: 





05/30/18 08:41


 Vital Signs











Temperature  97.9 F   05/30/18 07:40


 


Pulse Rate  51 L  05/30/18 07:40


 


Respiratory Rate  16   05/30/18 07:40


 


Blood Pressure  111/70   05/30/18 07:40


 


O2 Sat by Pulse Oximetry (%)      











Assessment: 





05/30/18 08:41


DETOX COMPLETED,NO WITHDRAWAL SYMPTOM


Plan: 





DISCHARGE TODAY,FOLLOW UP WITH AFTER CARE PROGRAM AS ARRANGEMENT

## 2018-05-30 NOTE — DS
[facility] Detox Discharge Summary


Admission Date: 


05/25/18





Discharge Date: 05/30/18





- History


Present History: Opioid Dependence


Additional Comments: 





FOLLOW UP WITH AFTER CARE PROGRAM AS ARRANGEMENT


Pertinent Past History: 





HIV


LEFT HEMIPARESIS


POLIOMYELITIS HISTORY


NICOTINE DEPENDENCE


DEHYDRATION


WEIGHT LOSS





- Physical Exam Results


Vital Signs: 


 Vital Signs











Temperature  97.9 F   05/30/18 07:40


 


Pulse Rate  51 L  05/30/18 07:40


 


Respiratory Rate  16   05/30/18 07:40


 


Blood Pressure  111/70   05/30/18 07:40


 


O2 Sat by Pulse Oximetry (%)      











Pertinent Admission Physical Exam Findings: 





WITHDRAWAL SIGNS AND SYMPTOM


 Laboratory Last Values











WBC  3.6 K/mm3 (4.0-10.0)  L  05/26/18  07:50    


 


RBC  4.45 M/mm3 (4.00-5.60)   05/26/18  07:50    


 


Hgb  12.3 GM/dL (11.7-16.9)   05/26/18  07:50    


 


Hct  37.2 % (35.4-49)   05/26/18  07:50    


 


MCV  83.6 fl (80-96)   05/26/18  07:50    


 


MCH  27.8 pg (25.7-33.7)   05/26/18  07:50    


 


MCHC  33.2 g/dl (32.0-35.9)   05/26/18  07:50    


 


RDW  14.4 % (11.9-15.9)   05/26/18  07:50    


 


Plt Count  203 K/MM3 (134-434)   05/26/18  07:50    


 


MPV  7.8 fl (7.5-11.1)   05/26/18  07:50    


 


Sodium  139 mmol/L (136-145)   05/26/18  07:50    


 


Potassium  4.1 mmol/L (3.5-5.1)   05/26/18  07:50    


 


Chloride  104 mmol/L ()   05/26/18  07:50    


 


Carbon Dioxide  29 mmol/L (21-32)   05/26/18  07:50    


 


Anion Gap  6  (8-16)  L  05/26/18  07:50    


 


BUN  11 mg/dL (7-18)  D 05/26/18  07:50    


 


Creatinine  0.7 mg/dL (0.7-1.3)   05/26/18  07:50    


 


Creat Clearance w eGFR  > 60  (>60)   05/26/18  07:50    


 


Random Glucose  69 mg/dL ()  L  05/26/18  07:50    


 


Calcium  8.4 mg/dL (8.5-10.1)  L  05/26/18  07:50    


 


Total Bilirubin  0.5 mg/dL (0.2-1.0)  D 05/26/18  07:50    


 


AST  15 U/L (15-37)   05/26/18  07:50    


 


ALT  19 U/L (12-78)   05/26/18  07:50    


 


Alkaline Phosphatase  65 U/L ()   05/26/18  07:50    


 


Total Protein  6.9 g/dl (6.4-8.2)   05/26/18  07:50    


 


Albumin  3.6 g/dl (3.4-5.0)   05/26/18  07:50    


 


Urine Color  Yellow   05/25/18  23:49    


 


Urine Appearance  Clear   05/25/18  23:49    


 


Urine pH  6.0  (5.0-8.0)   05/25/18  23:49    


 


Ur Specific Gravity  1.023  (1.001-1.035)   05/25/18  23:49    


 


Urine Protein  Negative  (NEGATIVE)   05/25/18  23:49    


 


Urine Glucose (UA)  Negative  (NEGATIVE)   05/25/18  23:49    


 


Urine Ketones  Negative  (NEGATIVE)   05/25/18  23:49    


 


Urine Blood  Negative  (NEGATIVE)   05/25/18  23:49    


 


Urine Nitrite  Negative  (NEGATIVE)   05/25/18  23:49    


 


Urine Bilirubin  Negative  (<2.0 mg/dL)   05/25/18  23:49    


 


Urine Urobilinogen  2.0 mg/dL (0.2-1.0)   05/25/18  23:49    


 


Ur Leukocyte Esterase  Negative  (NEGATIVE)   05/25/18  23:49    


 


RPR Titer  Nonreactive  (NONREACTIVE)   05/26/18  07:50    








 Vital Signs











Temperature  97.9 F   05/30/18 07:40


 


Pulse Rate  51 L  05/30/18 07:40


 


Respiratory Rate  16   05/30/18 07:40


 


Blood Pressure  111/70   05/30/18 07:40


 


O2 Sat by Pulse Oximetry (%)      














- Treatment


Hospital Course: Detox Protocol Followed, Detoxed Safely, Responded well, 

Discharged Condition Good





- Medication


Discharge Medications: 


Ambulatory Orders





Azithromycin 500 mg PO WEEKLY 05/25/18 


Elviteg/Cob/Emtri/Tenof Alafen [Genvoya (Non-Formulary)] 150 mg PO DAILY 05/25/ 18 


Sulfamethoxazole/Trimethoprim [Bactrim DS -] 1 tab PO DAILY 05/25/18 


levETIRAcetam [Keppra -] 1 tab PO BID 05/25/18 











- Diagnosis


(1) Opioid dependence with withdrawal


Current Visit: Yes   Status: Acute   





(2) Dehydration


Current Visit: Yes   Status: Acute   





(3) Nicotine dependence


Current Visit: Yes   Status: Acute   


Qualifiers: 


   Nicotine product type: cigarettes   Substance use status: uncomplicated   

Qualified Code(s): F17.210 - Nicotine dependence, cigarettes, uncomplicated   





(4) HIV (human immunodeficiency virus infection)


Current Visit: Yes   Status: Chronic   





(5) Hemiparesis of left nondominant side


Current Visit: Yes   Status: Chronic   


Qualifiers: 


   Hemiparesis etiology: non-cerebrovascular etiology   Qualified Code(s): 

G81.94 - Hemiplegia, unspecified affecting left nondominant side   





(6) Poliomyelitis


Current Visit: Yes   Status: Chronic   





(7) Underweight


Current Visit: Yes   Status: Chronic   





- AMA


Did Patient Leave Against Medical Advice: No

## 2018-05-31 VITALS — TEMPERATURE: 97.9 F | DIASTOLIC BLOOD PRESSURE: 83 MMHG | SYSTOLIC BLOOD PRESSURE: 117 MMHG | HEART RATE: 57 BPM

## 2018-05-31 RX ADMIN — LEVETIRACETAM SCH MG: 500 TABLET, FILM COATED ORAL at 09:58

## 2018-05-31 NOTE — PN
[facility] Progress Note


Note: 


Patient decided to sign out today AMA despite our strong recommendations to 

continue his treatment on inpatient basis.


He declined to be interviewed for admission. Stable for AMA.

## 2018-12-04 NOTE — DISCHARGE NOTE ADULT - WITHDRAWAL SYMPTOMS INCLUDE; NEGATIVE MOOD, URGES TO SMOKE, AND DIFFICULTY CONCENTRATING.
Statement Selected Z Plasty Text: The lesion was extirpated to the level of the fat with a #15 scalpel blade.  Given the location of the defect, shape of the defect and the proximity to free margins a Z-plasty was deemed most appropriate for repair.  Using a sterile surgical marker, the appropriate transposition arms of the Z-plasty were drawn incorporating the defect and placing the expected incisions within the relaxed skin tension lines where possible.    The area thus outlined was incised deep to adipose tissue with a #15 scalpel blade.  The skin margins were undermined to an appropriate distance in all directions utilizing iris scissors.  The opposing transposition arms were then transposed into place in opposite direction and anchored with interrupted buried subcutaneous sutures.

## 2019-11-25 NOTE — PROGRESS NOTE ADULT - PROBLEM SELECTOR PROBLEM 4
Nutrition, metabolism, and development symptoms
Need for prophylactic measure
Nutrition, metabolism, and development symptoms
no

## 2022-05-02 NOTE — DISCHARGE NOTE ADULT - PATIENT PORTAL LINK FT
CERTIFICATE OF RETURN TO WORK    May 2, 2022      Re:   Nawaf Ngo  [address] [address]                        This is to certify that Nawaf Ngo has been under my care from 5/2/2022 and can return to regular work on 5/4/22    RESTRICTIONS: sooner if feeling better and fever free and symptoms free for 24 hours      REMARKS:         SIGNATURE:___________________________________________,   5/2/2022      Timothy Muñoz MD          Urgent [address] [facility]   [address] [facility]  [phone] “You can access the [facility] Patient Portal, offered by [facility], by registering with the following website: [email or web address]”

## 2024-01-29 NOTE — PATIENT PROFILE ADULT. - [id number]_GEN_A_CORE_SD
[facility]    Medicine Progress Note - Hospitalist Service    Date of Admission:  1/13/2024    Assessment & Plan   Jerome Flanagan is a 48 year old male with PMH of severe alcohol use disorder, hypertension, and anxiety who was admitted on 1/13/2024 for evaluation after a suspected alcohol withdrawal seizure. Diagnosed with COVID-19 this admission, but is now recovered. He is currently awaiting commitment for CD treatment.  Hemoglobin also lower recently requiring RBC transfusion, monitoring.     Alcohol withdrawal seizure  History of recurrent falls, unsteady gait  T12-L2 compression fracture  *Per EMS and patient's mother, he was found on the ground with GTC movements. Denies prior history of seizures, but consumes alcohol chronically.    *In ED, he was noted to have a left posterior scalp soft tissue contusion/hematoma. Also noted to have multiple bruises likely old on his back with suggest either previous falls or previous seizure. Head CT neg for intracranial injury. CT abd/pelvis with mild age-indeterminate compression deformity at T12-L2 vertebral bodies probably chronic   *General Neurology consulted this admission. EEG with no obvious epileptiform discharges or electrographic seizures. Brain  MRI showed changes of possible recent seizure activity vs underlying mesial temporal sclerosis. Neurology felt the changes seen in the temporal lobe due to prior seizures and no other concerns. Did not recommend treatment.  *No seizures noted during this admission  -- PT/OT initially recommended TCU, but strength/mobility improved this admission; now hopeful that patient can go straight to IP chemical dependency (CD) program.   - next court hearing 1/30/24     Alcohol use disorder, severe   Alcoholic hepatitis without ascites   *LFTs elevated on admission. CT abd/pelvis neg for acute pathology .   *Labs gradually improved with continued alcohol abstinence  *Psych consulted this admission and  initiated a petition for commitment for CD treatment   -- on thiamine/folate/MVI  -- continue PTA gabapentin 600 mg TID  -- next court hearing 1/30/24     Acute on chronic anemia  Severe esophagitis  Severe thrombocytopenia: Resolved  *No reported hx of bleeding, baseline hgb seems to be 9-12 over the preceding 2 yrs.   *Hgb 9.9 on this admission. Decreased to 7 after initial IV hydration.   *Platelets initially low, but gradually normalized this admission.   *Transfused 1U PRBC on 1/15 for hgb  7.1, and again on 1/28 for hgb 7.0  *[facility] consulted during admission. EGD (1/16) showed severe esophagitis but no stigmata of bleeding  *Heme/Onc also consulted this admission. Given IV iron x2 and then continued on oral supplement. Hemolysis labs negative.   - Hgb had been recently stable in mid-8 range; Hgb 7.2 on routine check, 1/28, rechecked 7.0.  One unit RBC transfusion 1/29  - monitor hemoglobin, transfuse as needed   - continue pantoprazole (Protonix) bid   - He is hemodynamically stable without signs or symptoms of bleeding. Will monitor for now and consider reconsulting GI if Hgb continues to drop      COVID-19 infection, recovered  *Tested positive for COVID19 on 1/4/24. Was initially asymptomatic but then developed a low grade fever on 1/17. No respiratory symptoms. CXR neg for infiltrate.   *Fever was initially suspected to be due to COVID, though fevers persisted >1wk after COVID diagnosis so unclear if other etiology contributing. No other s/sx of infection elsewhere -- UA neg, blood cultures neg. Procal 0.83 with stable trend.   *ID consulted, no new recommendations. Advised to treat gout and alcohol use. No specific treatment indicated for COVID.  *Chemical DVT ppx was initially held due to anemia/thrombocytopenia, continued to defer given ongoing high risk for bleeding due to esophagitis, risk of bleeding, and recent drops in hemoglobin   *Considered COVID recovered on 1/25/24 and taken out of  isolation.      Possible gout flare, resolved  *Has hx of tophaceous gout. Allopurinol held on admission.   *Pt subsequently reported worsening pain and inflammation involving both wrist and small hand joints with  inflammation of the third digit middle IPJ  *Started on colchicine on 1/20. XR of hands showed marked soft tissue thickening/swelling about the PIP joint of the middle finger.    *Due to ongoing fever, Ortho was consulted to evaluate for infectious arthritis. Advised to continue allopurinol. No indication for surgery.   *Condition improved with allopurinol and colchicine  -- conttinue daily allopurinol     RUE superficial thrombophlebitis  *Developed RUE erythema, edema and discomfort on 1/23/24 near site of IV. Doppler US negative for DVT. IV removed.      Anion gap metabolic acidosis with respiratory compensation, resolved  Likely starvation ketoacidosis, resolved  *Initial labs notable for bicarb 10, AG 37. Lactate nl. VBG the morning following showed pH 7.3, pCO2 26 and bicarb 12, indicative of respiratory compensation. Serum ketones notably elevated.   *Labs normalized with supportive cares and oral intake.     Hypomagnesemia  Hypophosphatemia  Hypokalemia  Hyponatremia:Resolved  - Monitor and replace as needed per protocol      Essential hypertension  *Chronic and stable on amlodipine and lisinopril  - monitor blood pressure      Generalized anxiety disorder  *Chronic and stable on citalopram  - comfort and support offered    Disposition  - Estimated length of stay 24 to 48 hours pending court date, stable hemoglobin, and accepting CD treatment program  - Anticipated discharge to CD treatment  - Social work consulted          Diet: Regular Diet Adult  Snacks/Supplements Adult: Other; OK for supplement as needed; With Meals    DVT Prophylaxis: Pneumatic Compression Devices  Heard Catheter: Not present  Lines: None     Cardiac Monitoring: None  Code Status: Full Code      Clinically Significant Risk  Factors              # Hypoalbuminemia: Lowest albumin = 2.8 g/dL at 1/19/2024  7:07 AM, will monitor as appropriate     # Hypertension: Noted on problem list            # Financial/Environmental Concerns: unemployed         Disposition Plan      Expected Discharge Date: 01/31/2024, 12:00 PM    Destination: inpatient rehabilitation facility  Discharge Comments: Preliminary hearing 1/25 at 0900, CD committment process. COVID recovered 1/25            Braden Thompson MD  Hospitalist Service  [facility]  Securely message with [employer] (more info)  Text page via [employer] Paging/Directory   ______________________________________________________________________    Interval History   First visit with patient today.  Lying in bed, pleasant and interactive, calm and cooperative.  Lower hemoglobin yesterday with one unit RBC transfusion.  Court appearance scheduled for tomorrow for CD treatment.    Physical Exam   Vital Signs: Temp: 98.6  F (37  C) Temp src: Oral BP: (!) 151/98 Pulse: 83   Resp: 16 SpO2: 99 % O2 Device: None (Room air)    Weight: 160 lbs 12.8 oz    GENERAL awake and alert, lying in bed, pleasant and interactive   LUNGS no wheezes or crackles  HEART S1, S2 with RRR  ABDOMEN soft, nontender  MUSCULOSKELETAL extremities without edema  SKIN warm and dry  NEURO moves upper and lower extremities spontaneously and to command  MENTAL STATUS answering questions and following simple commands    Medical Decision Making             Data     I have personally reviewed the following data over the past 24 hrs:    10.3  \   8.5 (L)   / 438     N/A N/A N/A /  N/A   N/A N/A N/A \       Imaging results reviewed over the past 24 hrs:   No results found for this or any previous visit (from the past 24 hour(s)).   yes